# Patient Record
Sex: MALE | Race: WHITE | NOT HISPANIC OR LATINO | Employment: FULL TIME | URBAN - METROPOLITAN AREA
[De-identification: names, ages, dates, MRNs, and addresses within clinical notes are randomized per-mention and may not be internally consistent; named-entity substitution may affect disease eponyms.]

---

## 2021-06-22 ENCOUNTER — OFFICE VISIT (OUTPATIENT)
Dept: PODIATRY | Facility: CLINIC | Age: 66
End: 2021-06-22
Payer: COMMERCIAL

## 2021-06-22 ENCOUNTER — HOSPITAL ENCOUNTER (OUTPATIENT)
Dept: RADIOLOGY | Facility: HOSPITAL | Age: 66
Discharge: HOME | End: 2021-06-22
Attending: PODIATRIST
Payer: COMMERCIAL

## 2021-06-22 VITALS
WEIGHT: 163.63 LBS | HEIGHT: 66 IN | DIASTOLIC BLOOD PRESSURE: 68 MMHG | BODY MASS INDEX: 26.3 KG/M2 | SYSTOLIC BLOOD PRESSURE: 97 MMHG | HEART RATE: 72 BPM | RESPIRATION RATE: 18 BRPM

## 2021-06-22 DIAGNOSIS — D21.21 FIBROMA OF RIGHT FOOT: ICD-10-CM

## 2021-06-22 DIAGNOSIS — M76.61 ACHILLES TENDINITIS OF RIGHT LOWER EXTREMITY: Primary | ICD-10-CM

## 2021-06-22 DIAGNOSIS — M76.61 ACHILLES BURSITIS OF RIGHT LOWER EXTREMITY: ICD-10-CM

## 2021-06-22 DIAGNOSIS — M79.672 HEEL PAIN, BILATERAL: ICD-10-CM

## 2021-06-22 DIAGNOSIS — M79.671 HEEL PAIN, BILATERAL: ICD-10-CM

## 2021-06-22 PROCEDURE — 73620 X-RAY EXAM OF FOOT: CPT | Mod: 26,50,, | Performed by: RADIOLOGY

## 2021-06-22 PROCEDURE — 73620 X-RAY EXAM OF FOOT: CPT | Mod: TC,50,FY

## 2021-06-22 PROCEDURE — 99203 OFFICE O/P NEW LOW 30 MIN: CPT | Mod: S$GLB,,, | Performed by: PODIATRIST

## 2021-06-22 PROCEDURE — 99999 PR PBB SHADOW E&M-NEW PATIENT-LVL IV: CPT | Mod: PBBFAC,,, | Performed by: PODIATRIST

## 2021-06-22 PROCEDURE — 1125F PR PAIN SEVERITY QUANTIFIED, PAIN PRESENT: ICD-10-PCS | Mod: S$GLB,,, | Performed by: PODIATRIST

## 2021-06-22 PROCEDURE — 73620 XR FOOT 2 VIEW BILATERAL: ICD-10-PCS | Mod: 26,50,, | Performed by: RADIOLOGY

## 2021-06-22 PROCEDURE — 1125F AMNT PAIN NOTED PAIN PRSNT: CPT | Mod: S$GLB,,, | Performed by: PODIATRIST

## 2021-06-22 PROCEDURE — 99203 PR OFFICE/OUTPT VISIT, NEW, LEVL III, 30-44 MIN: ICD-10-PCS | Mod: S$GLB,,, | Performed by: PODIATRIST

## 2021-06-22 PROCEDURE — 99999 PR PBB SHADOW E&M-NEW PATIENT-LVL IV: ICD-10-PCS | Mod: PBBFAC,,, | Performed by: PODIATRIST

## 2021-06-22 RX ORDER — AMOXICILLIN 500 MG
2 CAPSULE ORAL DAILY
COMMUNITY

## 2021-06-22 RX ORDER — CHOLECALCIFEROL (VITAMIN D3) 25 MCG
1000 TABLET ORAL DAILY
COMMUNITY

## 2021-07-22 ENCOUNTER — OFFICE VISIT (OUTPATIENT)
Dept: PODIATRY | Facility: CLINIC | Age: 66
End: 2021-07-22
Payer: COMMERCIAL

## 2021-07-22 VITALS
DIASTOLIC BLOOD PRESSURE: 81 MMHG | WEIGHT: 167.38 LBS | HEART RATE: 79 BPM | RESPIRATION RATE: 18 BRPM | SYSTOLIC BLOOD PRESSURE: 141 MMHG | HEIGHT: 66 IN | BODY MASS INDEX: 26.9 KG/M2

## 2021-07-22 DIAGNOSIS — M76.61 ACHILLES TENDINITIS OF RIGHT LOWER EXTREMITY: ICD-10-CM

## 2021-07-22 DIAGNOSIS — D21.21 FIBROMA OF RIGHT FOOT: ICD-10-CM

## 2021-07-22 DIAGNOSIS — M79.672 HEEL PAIN, BILATERAL: ICD-10-CM

## 2021-07-22 DIAGNOSIS — M79.671 HEEL PAIN, BILATERAL: ICD-10-CM

## 2021-07-22 DIAGNOSIS — M76.61 ACHILLES BURSITIS OF RIGHT LOWER EXTREMITY: Primary | ICD-10-CM

## 2021-07-22 PROCEDURE — 99999 PR PBB SHADOW E&M-EST. PATIENT-LVL III: ICD-10-PCS | Mod: PBBFAC,,, | Performed by: PODIATRIST

## 2021-07-22 PROCEDURE — 99214 OFFICE O/P EST MOD 30 MIN: CPT | Mod: S$GLB,,, | Performed by: PODIATRIST

## 2021-07-22 PROCEDURE — 99214 PR OFFICE/OUTPT VISIT, EST, LEVL IV, 30-39 MIN: ICD-10-PCS | Mod: S$GLB,,, | Performed by: PODIATRIST

## 2021-07-22 PROCEDURE — 99999 PR PBB SHADOW E&M-EST. PATIENT-LVL III: CPT | Mod: PBBFAC,,, | Performed by: PODIATRIST

## 2021-09-09 ENCOUNTER — OFFICE VISIT (OUTPATIENT)
Dept: PODIATRY | Facility: CLINIC | Age: 66
End: 2021-09-09
Payer: COMMERCIAL

## 2021-09-09 VITALS
DIASTOLIC BLOOD PRESSURE: 76 MMHG | SYSTOLIC BLOOD PRESSURE: 128 MMHG | RESPIRATION RATE: 18 BRPM | BODY MASS INDEX: 26.86 KG/M2 | HEART RATE: 80 BPM | WEIGHT: 167.13 LBS | HEIGHT: 66 IN

## 2021-09-09 DIAGNOSIS — D21.21 FIBROMA OF RIGHT FOOT: ICD-10-CM

## 2021-09-09 DIAGNOSIS — M76.61 ACHILLES BURSITIS OF RIGHT LOWER EXTREMITY: Primary | ICD-10-CM

## 2021-09-09 PROCEDURE — 99213 PR OFFICE/OUTPT VISIT, EST, LEVL III, 20-29 MIN: ICD-10-PCS | Mod: S$GLB,,, | Performed by: PODIATRIST

## 2021-09-09 PROCEDURE — 99999 PR PBB SHADOW E&M-EST. PATIENT-LVL III: CPT | Mod: PBBFAC,,, | Performed by: PODIATRIST

## 2021-09-09 PROCEDURE — 99999 PR PBB SHADOW E&M-EST. PATIENT-LVL III: ICD-10-PCS | Mod: PBBFAC,,, | Performed by: PODIATRIST

## 2021-09-09 PROCEDURE — 99213 OFFICE O/P EST LOW 20 MIN: CPT | Mod: S$GLB,,, | Performed by: PODIATRIST

## 2021-10-23 ENCOUNTER — HOSPITAL ENCOUNTER (EMERGENCY)
Facility: HOSPITAL | Age: 66
Discharge: HOME | End: 2021-10-23
Attending: FAMILY MEDICINE
Payer: COMMERCIAL

## 2021-10-23 VITALS
OXYGEN SATURATION: 98 % | RESPIRATION RATE: 18 BRPM | HEIGHT: 66 IN | TEMPERATURE: 98 F | DIASTOLIC BLOOD PRESSURE: 82 MMHG | WEIGHT: 155 LBS | HEART RATE: 79 BPM | BODY MASS INDEX: 24.91 KG/M2 | SYSTOLIC BLOOD PRESSURE: 106 MMHG

## 2021-10-23 DIAGNOSIS — N30.01 ACUTE CYSTITIS WITH HEMATURIA: Primary | ICD-10-CM

## 2021-10-23 LAB
ALBUMIN SERPL BCP-MCNC: 3.8 G/DL (ref 3.5–5.2)
ALP SERPL-CCNC: 56 U/L (ref 55–135)
ALT SERPL W/O P-5'-P-CCNC: 18 U/L (ref 10–44)
ANION GAP SERPL CALC-SCNC: 10 MMOL/L (ref 8–16)
AST SERPL-CCNC: 14 U/L (ref 10–40)
BACTERIA #/AREA URNS HPF: ABNORMAL /HPF
BASOPHILS # BLD AUTO: 0.06 K/UL (ref 0–0.2)
BASOPHILS NFR BLD: 0.8 % (ref 0–1.9)
BILIRUB SERPL-MCNC: 0.8 MG/DL (ref 0.1–1)
BILIRUB UR QL STRIP: NEGATIVE
BUN SERPL-MCNC: 17 MG/DL (ref 8–23)
CALCIUM SERPL-MCNC: 9.4 MG/DL (ref 8.7–10.5)
CHLORIDE SERPL-SCNC: 104 MMOL/L (ref 95–110)
CLARITY UR: CLEAR
CO2 SERPL-SCNC: 25 MMOL/L (ref 23–29)
COLOR UR: YELLOW
CREAT SERPL-MCNC: 1 MG/DL (ref 0.5–1.4)
DIFFERENTIAL METHOD: NORMAL
EOSINOPHIL # BLD AUTO: 0.1 K/UL (ref 0–0.5)
EOSINOPHIL NFR BLD: 1.4 % (ref 0–8)
ERYTHROCYTE [DISTWIDTH] IN BLOOD BY AUTOMATED COUNT: 13 % (ref 11.5–14.5)
EST. GFR  (AFRICAN AMERICAN): >60 ML/MIN/1.73 M^2
EST. GFR  (NON AFRICAN AMERICAN): >60 ML/MIN/1.73 M^2
GLUCOSE SERPL-MCNC: 93 MG/DL (ref 70–110)
GLUCOSE UR QL STRIP: NEGATIVE
HCT VFR BLD AUTO: 44.1 % (ref 40–54)
HGB BLD-MCNC: 15 G/DL (ref 14–18)
HGB UR QL STRIP: ABNORMAL
HYALINE CASTS #/AREA URNS LPF: 0 /LPF
IMM GRANULOCYTES # BLD AUTO: 0.02 K/UL (ref 0–0.04)
IMM GRANULOCYTES NFR BLD AUTO: 0.3 % (ref 0–0.5)
KETONES UR QL STRIP: NEGATIVE
LEUKOCYTE ESTERASE UR QL STRIP: ABNORMAL
LYMPHOCYTES # BLD AUTO: 1.7 K/UL (ref 1–4.8)
LYMPHOCYTES NFR BLD: 22.2 % (ref 18–48)
MCH RBC QN AUTO: 29.5 PG (ref 27–31)
MCHC RBC AUTO-ENTMCNC: 34 G/DL (ref 32–36)
MCV RBC AUTO: 87 FL (ref 82–98)
MICROSCOPIC COMMENT: ABNORMAL
MONOCYTES # BLD AUTO: 0.8 K/UL (ref 0.3–1)
MONOCYTES NFR BLD: 10 % (ref 4–15)
NEUTROPHILS # BLD AUTO: 5.1 K/UL (ref 1.8–7.7)
NEUTROPHILS NFR BLD: 65.3 % (ref 38–73)
NITRITE UR QL STRIP: POSITIVE
NRBC BLD-RTO: 0 /100 WBC
PH UR STRIP: 7 [PH] (ref 5–8)
PLATELET # BLD AUTO: 210 K/UL (ref 150–450)
PMV BLD AUTO: 9.6 FL (ref 9.2–12.9)
POTASSIUM SERPL-SCNC: 4.3 MMOL/L (ref 3.5–5.1)
PROT SERPL-MCNC: 7.4 G/DL (ref 6–8.4)
PROT UR QL STRIP: ABNORMAL
RBC # BLD AUTO: 5.09 M/UL (ref 4.6–6.2)
RBC #/AREA URNS HPF: 30 /HPF (ref 0–4)
SODIUM SERPL-SCNC: 139 MMOL/L (ref 136–145)
SP GR UR STRIP: >=1.03 (ref 1–1.03)
URN SPEC COLLECT METH UR: ABNORMAL
UROBILINOGEN UR STRIP-ACNC: NEGATIVE EU/DL
WBC # BLD AUTO: 7.78 K/UL (ref 3.9–12.7)
WBC #/AREA URNS HPF: >100 /HPF (ref 0–5)

## 2021-10-23 PROCEDURE — 80053 COMPREHEN METABOLIC PANEL: CPT | Performed by: FAMILY MEDICINE

## 2021-10-23 PROCEDURE — 25000003 PHARM REV CODE 250: Performed by: FAMILY MEDICINE

## 2021-10-23 PROCEDURE — 81000 URINALYSIS NONAUTO W/SCOPE: CPT | Performed by: FAMILY MEDICINE

## 2021-10-23 PROCEDURE — 87086 URINE CULTURE/COLONY COUNT: CPT | Performed by: FAMILY MEDICINE

## 2021-10-23 PROCEDURE — 87088 URINE BACTERIA CULTURE: CPT | Performed by: FAMILY MEDICINE

## 2021-10-23 PROCEDURE — 85025 COMPLETE CBC W/AUTO DIFF WBC: CPT | Performed by: FAMILY MEDICINE

## 2021-10-23 PROCEDURE — 87077 CULTURE AEROBIC IDENTIFY: CPT | Performed by: FAMILY MEDICINE

## 2021-10-23 PROCEDURE — 99283 EMERGENCY DEPT VISIT LOW MDM: CPT | Mod: 25

## 2021-10-23 PROCEDURE — 87186 SC STD MICRODIL/AGAR DIL: CPT | Performed by: FAMILY MEDICINE

## 2021-10-23 RX ORDER — LEVOFLOXACIN 500 MG/1
500 TABLET, FILM COATED ORAL DAILY
Qty: 5 TABLET | Refills: 0 | Status: SHIPPED | OUTPATIENT
Start: 2021-10-23 | End: 2021-10-23 | Stop reason: SDUPTHER

## 2021-10-23 RX ORDER — LEVOFLOXACIN 250 MG/1
500 TABLET ORAL
Status: COMPLETED | OUTPATIENT
Start: 2021-10-23 | End: 2021-10-23

## 2021-10-23 RX ORDER — LEVOFLOXACIN 500 MG/1
500 TABLET, FILM COATED ORAL DAILY
Qty: 5 TABLET | Refills: 0 | Status: SHIPPED | OUTPATIENT
Start: 2021-10-23 | End: 2021-10-30

## 2021-10-23 RX ADMIN — LEVOFLOXACIN 500 MG: 250 TABLET, FILM COATED ORAL at 01:10

## 2021-10-25 LAB — BACTERIA UR CULT: ABNORMAL

## 2021-11-24 ENCOUNTER — OFFICE VISIT (OUTPATIENT)
Dept: FAMILY MEDICINE | Facility: CLINIC | Age: 66
End: 2021-11-24
Payer: COMMERCIAL

## 2021-11-24 ENCOUNTER — LAB VISIT (OUTPATIENT)
Dept: LAB | Facility: HOSPITAL | Age: 66
End: 2021-11-24
Attending: FAMILY MEDICINE
Payer: COMMERCIAL

## 2021-11-24 VITALS
HEART RATE: 85 BPM | BODY MASS INDEX: 26.52 KG/M2 | RESPIRATION RATE: 12 BRPM | SYSTOLIC BLOOD PRESSURE: 110 MMHG | HEIGHT: 66 IN | OXYGEN SATURATION: 98 % | TEMPERATURE: 98 F | DIASTOLIC BLOOD PRESSURE: 68 MMHG | WEIGHT: 165 LBS

## 2021-11-24 DIAGNOSIS — Z12.11 COLON CANCER SCREENING: ICD-10-CM

## 2021-11-24 DIAGNOSIS — Z00.00 ANNUAL PHYSICAL EXAM: ICD-10-CM

## 2021-11-24 DIAGNOSIS — Z11.59 ENCOUNTER FOR HEPATITIS C SCREENING TEST FOR LOW RISK PATIENT: ICD-10-CM

## 2021-11-24 DIAGNOSIS — Z76.89 ENCOUNTER TO ESTABLISH CARE: Primary | ICD-10-CM

## 2021-11-24 DIAGNOSIS — Z11.4 SCREENING FOR HIV (HUMAN IMMUNODEFICIENCY VIRUS): ICD-10-CM

## 2021-11-24 DIAGNOSIS — Z12.5 SCREENING FOR PROSTATE CANCER: ICD-10-CM

## 2021-11-24 DIAGNOSIS — N30.01 ACUTE CYSTITIS WITH HEMATURIA: ICD-10-CM

## 2021-11-24 DIAGNOSIS — R63.4 WEIGHT LOSS: ICD-10-CM

## 2021-11-24 LAB
ALBUMIN SERPL BCP-MCNC: 4.3 G/DL (ref 3.5–5.2)
ALP SERPL-CCNC: 49 U/L (ref 55–135)
ALT SERPL W/O P-5'-P-CCNC: 25 U/L (ref 10–44)
ANION GAP SERPL CALC-SCNC: 13 MMOL/L (ref 8–16)
AST SERPL-CCNC: 18 U/L (ref 10–40)
BASOPHILS # BLD AUTO: 0.05 K/UL (ref 0–0.2)
BASOPHILS NFR BLD: 1.1 % (ref 0–1.9)
BILIRUB SERPL-MCNC: 0.8 MG/DL (ref 0.1–1)
BUN SERPL-MCNC: 13 MG/DL (ref 8–23)
CALCIUM SERPL-MCNC: 9.8 MG/DL (ref 8.7–10.5)
CHLORIDE SERPL-SCNC: 103 MMOL/L (ref 95–110)
CHOLEST SERPL-MCNC: 254 MG/DL (ref 120–199)
CHOLEST/HDLC SERPL: 4.3 {RATIO} (ref 2–5)
CO2 SERPL-SCNC: 25 MMOL/L (ref 23–29)
COMPLEXED PSA SERPL-MCNC: 4.5 NG/ML (ref 0–4)
CREAT SERPL-MCNC: 1 MG/DL (ref 0.5–1.4)
DIFFERENTIAL METHOD: NORMAL
EOSINOPHIL # BLD AUTO: 0.1 K/UL (ref 0–0.5)
EOSINOPHIL NFR BLD: 2.9 % (ref 0–8)
ERYTHROCYTE [DISTWIDTH] IN BLOOD BY AUTOMATED COUNT: 12.6 % (ref 11.5–14.5)
EST. GFR  (AFRICAN AMERICAN): >60 ML/MIN/1.73 M^2
EST. GFR  (NON AFRICAN AMERICAN): >60 ML/MIN/1.73 M^2
ESTIMATED AVG GLUCOSE: 117 MG/DL (ref 68–131)
GLUCOSE SERPL-MCNC: 104 MG/DL (ref 70–110)
HBA1C MFR BLD: 5.7 % (ref 4–5.6)
HCT VFR BLD AUTO: 46.4 % (ref 40–54)
HDLC SERPL-MCNC: 59 MG/DL (ref 40–75)
HDLC SERPL: 23.2 % (ref 20–50)
HGB BLD-MCNC: 16 G/DL (ref 14–18)
IMM GRANULOCYTES # BLD AUTO: 0.01 K/UL (ref 0–0.04)
IMM GRANULOCYTES NFR BLD AUTO: 0.2 % (ref 0–0.5)
LDLC SERPL CALC-MCNC: 181.6 MG/DL (ref 63–159)
LYMPHOCYTES # BLD AUTO: 1.5 K/UL (ref 1–4.8)
LYMPHOCYTES NFR BLD: 33.5 % (ref 18–48)
MCH RBC QN AUTO: 29.3 PG (ref 27–31)
MCHC RBC AUTO-ENTMCNC: 34.5 G/DL (ref 32–36)
MCV RBC AUTO: 85 FL (ref 82–98)
MONOCYTES # BLD AUTO: 0.5 K/UL (ref 0.3–1)
MONOCYTES NFR BLD: 10.1 % (ref 4–15)
NEUTROPHILS # BLD AUTO: 2.3 K/UL (ref 1.8–7.7)
NEUTROPHILS NFR BLD: 52.2 % (ref 38–73)
NONHDLC SERPL-MCNC: 195 MG/DL
NRBC BLD-RTO: 0 /100 WBC
PLATELET # BLD AUTO: 169 K/UL (ref 150–450)
PMV BLD AUTO: 11 FL (ref 9.2–12.9)
POTASSIUM SERPL-SCNC: 4.2 MMOL/L (ref 3.5–5.1)
PROT SERPL-MCNC: 7.7 G/DL (ref 6–8.4)
RBC # BLD AUTO: 5.47 M/UL (ref 4.6–6.2)
SODIUM SERPL-SCNC: 141 MMOL/L (ref 136–145)
T4 FREE SERPL-MCNC: 0.9 NG/DL (ref 0.71–1.51)
TRIGL SERPL-MCNC: 67 MG/DL (ref 30–150)
TSH SERPL DL<=0.005 MIU/L-ACNC: 1.67 UIU/ML (ref 0.4–4)
WBC # BLD AUTO: 4.45 K/UL (ref 3.9–12.7)

## 2021-11-24 PROCEDURE — 99387 INIT PM E/M NEW PAT 65+ YRS: CPT | Mod: S$GLB,,, | Performed by: FAMILY MEDICINE

## 2021-11-24 PROCEDURE — 80053 COMPREHEN METABOLIC PANEL: CPT | Performed by: FAMILY MEDICINE

## 2021-11-24 PROCEDURE — 84443 ASSAY THYROID STIM HORMONE: CPT | Performed by: FAMILY MEDICINE

## 2021-11-24 PROCEDURE — 84439 ASSAY OF FREE THYROXINE: CPT | Performed by: FAMILY MEDICINE

## 2021-11-24 PROCEDURE — 99999 PR PBB SHADOW E&M-EST. PATIENT-LVL IV: ICD-10-PCS | Mod: PBBFAC,,, | Performed by: FAMILY MEDICINE

## 2021-11-24 PROCEDURE — 99387 PR PREVENTIVE VISIT,NEW,65 & OVER: ICD-10-PCS | Mod: S$GLB,,, | Performed by: FAMILY MEDICINE

## 2021-11-24 PROCEDURE — 80061 LIPID PANEL: CPT | Performed by: FAMILY MEDICINE

## 2021-11-24 PROCEDURE — 36415 COLL VENOUS BLD VENIPUNCTURE: CPT | Performed by: FAMILY MEDICINE

## 2021-11-24 PROCEDURE — 86803 HEPATITIS C AB TEST: CPT | Performed by: FAMILY MEDICINE

## 2021-11-24 PROCEDURE — 83036 HEMOGLOBIN GLYCOSYLATED A1C: CPT | Performed by: FAMILY MEDICINE

## 2021-11-24 PROCEDURE — 87389 HIV-1 AG W/HIV-1&-2 AB AG IA: CPT | Performed by: FAMILY MEDICINE

## 2021-11-24 PROCEDURE — 99999 PR PBB SHADOW E&M-EST. PATIENT-LVL IV: CPT | Mod: PBBFAC,,, | Performed by: FAMILY MEDICINE

## 2021-11-24 PROCEDURE — 85025 COMPLETE CBC W/AUTO DIFF WBC: CPT | Performed by: FAMILY MEDICINE

## 2021-11-24 PROCEDURE — 84153 ASSAY OF PSA TOTAL: CPT | Performed by: FAMILY MEDICINE

## 2021-11-26 LAB
HCV AB SERPL QL IA: NEGATIVE
HIV 1+2 AB+HIV1 P24 AG SERPL QL IA: NEGATIVE

## 2021-11-29 ENCOUNTER — PATIENT MESSAGE (OUTPATIENT)
Dept: FAMILY MEDICINE | Facility: CLINIC | Age: 66
End: 2021-11-29
Payer: COMMERCIAL

## 2021-12-02 ENCOUNTER — PATIENT MESSAGE (OUTPATIENT)
Dept: FAMILY MEDICINE | Facility: CLINIC | Age: 66
End: 2021-12-02
Payer: COMMERCIAL

## 2021-12-02 DIAGNOSIS — E78.5 HYPERLIPIDEMIA, UNSPECIFIED HYPERLIPIDEMIA TYPE: Primary | ICD-10-CM

## 2021-12-02 DIAGNOSIS — R97.20 ELEVATED PSA: ICD-10-CM

## 2021-12-02 RX ORDER — ATORVASTATIN CALCIUM 40 MG/1
40 TABLET, FILM COATED ORAL DAILY
Qty: 90 TABLET | Refills: 3 | Status: SHIPPED | OUTPATIENT
Start: 2021-12-02 | End: 2022-11-09 | Stop reason: SDUPTHER

## 2021-12-03 ENCOUNTER — TELEPHONE (OUTPATIENT)
Dept: FAMILY MEDICINE | Facility: CLINIC | Age: 66
End: 2021-12-03
Payer: COMMERCIAL

## 2021-12-29 ENCOUNTER — LAB VISIT (OUTPATIENT)
Dept: LAB | Facility: HOSPITAL | Age: 66
End: 2021-12-29
Attending: FAMILY MEDICINE
Payer: COMMERCIAL

## 2021-12-29 DIAGNOSIS — R97.20 ELEVATED PSA: ICD-10-CM

## 2021-12-29 LAB — COMPLEXED PSA SERPL-MCNC: 3.5 NG/ML (ref 0–4)

## 2021-12-29 PROCEDURE — 84153 ASSAY OF PSA TOTAL: CPT | Performed by: FAMILY MEDICINE

## 2021-12-29 PROCEDURE — 36415 COLL VENOUS BLD VENIPUNCTURE: CPT | Performed by: FAMILY MEDICINE

## 2022-02-25 ENCOUNTER — OFFICE VISIT (OUTPATIENT)
Dept: FAMILY MEDICINE | Facility: CLINIC | Age: 67
End: 2022-02-25
Payer: COMMERCIAL

## 2022-02-25 VITALS
RESPIRATION RATE: 12 BRPM | HEIGHT: 66 IN | SYSTOLIC BLOOD PRESSURE: 134 MMHG | HEART RATE: 76 BPM | BODY MASS INDEX: 26.9 KG/M2 | DIASTOLIC BLOOD PRESSURE: 72 MMHG | WEIGHT: 167.38 LBS | TEMPERATURE: 98 F | OXYGEN SATURATION: 97 %

## 2022-02-25 DIAGNOSIS — R10.12 LUQ ABDOMINAL PAIN: Primary | ICD-10-CM

## 2022-02-25 PROCEDURE — 1159F MED LIST DOCD IN RCRD: CPT | Mod: CPTII,S$GLB,, | Performed by: FAMILY MEDICINE

## 2022-02-25 PROCEDURE — 1101F PR PT FALLS ASSESS DOC 0-1 FALLS W/OUT INJ PAST YR: ICD-10-PCS | Mod: CPTII,S$GLB,, | Performed by: FAMILY MEDICINE

## 2022-02-25 PROCEDURE — 1159F PR MEDICATION LIST DOCUMENTED IN MEDICAL RECORD: ICD-10-PCS | Mod: CPTII,S$GLB,, | Performed by: FAMILY MEDICINE

## 2022-02-25 PROCEDURE — 3075F SYST BP GE 130 - 139MM HG: CPT | Mod: CPTII,S$GLB,, | Performed by: FAMILY MEDICINE

## 2022-02-25 PROCEDURE — 3008F PR BODY MASS INDEX (BMI) DOCUMENTED: ICD-10-PCS | Mod: CPTII,S$GLB,, | Performed by: FAMILY MEDICINE

## 2022-02-25 PROCEDURE — 1160F RVW MEDS BY RX/DR IN RCRD: CPT | Mod: CPTII,S$GLB,, | Performed by: FAMILY MEDICINE

## 2022-02-25 PROCEDURE — 3075F PR MOST RECENT SYSTOLIC BLOOD PRESS GE 130-139MM HG: ICD-10-PCS | Mod: CPTII,S$GLB,, | Performed by: FAMILY MEDICINE

## 2022-02-25 PROCEDURE — 99214 PR OFFICE/OUTPT VISIT, EST, LEVL IV, 30-39 MIN: ICD-10-PCS | Mod: S$GLB,,, | Performed by: FAMILY MEDICINE

## 2022-02-25 PROCEDURE — 3288F PR FALLS RISK ASSESSMENT DOCUMENTED: ICD-10-PCS | Mod: CPTII,S$GLB,, | Performed by: FAMILY MEDICINE

## 2022-02-25 PROCEDURE — 3078F PR MOST RECENT DIASTOLIC BLOOD PRESSURE < 80 MM HG: ICD-10-PCS | Mod: CPTII,S$GLB,, | Performed by: FAMILY MEDICINE

## 2022-02-25 PROCEDURE — 99999 PR PBB SHADOW E&M-EST. PATIENT-LVL IV: ICD-10-PCS | Mod: PBBFAC,,, | Performed by: FAMILY MEDICINE

## 2022-02-25 PROCEDURE — 1101F PT FALLS ASSESS-DOCD LE1/YR: CPT | Mod: CPTII,S$GLB,, | Performed by: FAMILY MEDICINE

## 2022-02-25 PROCEDURE — 1125F AMNT PAIN NOTED PAIN PRSNT: CPT | Mod: CPTII,S$GLB,, | Performed by: FAMILY MEDICINE

## 2022-02-25 PROCEDURE — 1125F PR PAIN SEVERITY QUANTIFIED, PAIN PRESENT: ICD-10-PCS | Mod: CPTII,S$GLB,, | Performed by: FAMILY MEDICINE

## 2022-02-25 PROCEDURE — 1160F PR REVIEW ALL MEDS BY PRESCRIBER/CLIN PHARMACIST DOCUMENTED: ICD-10-PCS | Mod: CPTII,S$GLB,, | Performed by: FAMILY MEDICINE

## 2022-02-25 PROCEDURE — 3288F FALL RISK ASSESSMENT DOCD: CPT | Mod: CPTII,S$GLB,, | Performed by: FAMILY MEDICINE

## 2022-02-25 PROCEDURE — 99999 PR PBB SHADOW E&M-EST. PATIENT-LVL IV: CPT | Mod: PBBFAC,,, | Performed by: FAMILY MEDICINE

## 2022-02-25 PROCEDURE — 99214 OFFICE O/P EST MOD 30 MIN: CPT | Mod: S$GLB,,, | Performed by: FAMILY MEDICINE

## 2022-02-25 PROCEDURE — 3078F DIAST BP <80 MM HG: CPT | Mod: CPTII,S$GLB,, | Performed by: FAMILY MEDICINE

## 2022-02-25 PROCEDURE — 3008F BODY MASS INDEX DOCD: CPT | Mod: CPTII,S$GLB,, | Performed by: FAMILY MEDICINE

## 2022-02-25 RX ORDER — GABAPENTIN 100 MG/1
100 CAPSULE ORAL 3 TIMES DAILY
Qty: 90 CAPSULE | Refills: 11 | Status: SHIPPED | OUTPATIENT
Start: 2022-02-25 | End: 2023-02-25

## 2022-02-25 RX ORDER — DIAZEPAM 2 MG/1
2 TABLET ORAL ONCE
Qty: 1 TABLET | Refills: 0 | Status: SHIPPED | OUTPATIENT
Start: 2022-02-25 | End: 2023-08-15

## 2022-02-25 RX ORDER — METHYLPREDNISOLONE 4 MG/1
TABLET ORAL
Qty: 21 EACH | Refills: 0 | Status: SHIPPED | OUTPATIENT
Start: 2022-02-25 | End: 2022-03-18

## 2022-02-25 NOTE — PROGRESS NOTES
"[facility] - Clinic Note    Subjective      Mr. Thomas is a 66 y.o. male who presents to clinic for Flank Pain (L side x3w)    Patient reports that for the last 3-4 weeks he has had in intermittent pain on his left upper quadrant.  Denies any changes to bowel movements.  Denies any pain or burning with urination.  Denies any blood in the urine.  Denies any nausea or vomiting.  Started off as stinging pains in this area does now little bit more severe.  Throughout the day.  No overlying rash.    PMH Fernando has no past medical history on file.   PSXH Fernando has a past surgical history that includes Carpal tunnel release (Bilateral); Hernia repair; and Knee Arthroplasty (Right).    Fernando's family history includes Cancer in his father and mother.   SH Fernando reports that he has never smoked. He has never used smokeless tobacco. He reports previous alcohol use. He reports previous drug use.   ALG Fernando is allergic to augmentin [amoxicillin-pot clavulanate] and biaxin [clarithromycin].   MED Fernando has a current medication list which includes the following prescription(s): atorvastatin, fish oil-omega-3 fatty acids, vitamin d, diazepam, gabapentin, and methylprednisolone.     Review of Systems   Constitutional: Negative for chills and fever.   Respiratory: Negative for shortness of breath.    Cardiovascular: Negative for chest pain.   Gastrointestinal: Positive for abdominal pain. Negative for constipation, diarrhea, nausea and vomiting.   Genitourinary: Negative for dysuria.     Objective     /72 (BP Location: Left arm, Patient Position: Sitting, BP Method: Large (Manual))   Pulse 76   Temp 98.1 °F (36.7 °C) (Temporal)   Resp 12   Ht 5' 6" (1.676 m)   Wt 75.9 kg (167 lb 6.4 oz)   SpO2 97%   BMI 27.02 kg/m²     Physical Exam  Vitals and nursing note reviewed.   Constitutional:       General: He is not in acute distress.     Appearance: Normal appearance. He is well-developed. He is not " diaphoretic.   HENT:      Head: Normocephalic and atraumatic.      Right Ear: External ear normal.      Left Ear: External ear normal.   Eyes:      General:         Right eye: No discharge.         Left eye: No discharge.   Cardiovascular:      Rate and Rhythm: Normal rate and regular rhythm.      Heart sounds: Normal heart sounds.   Pulmonary:      Effort: Pulmonary effort is normal.      Breath sounds: Normal breath sounds. No wheezing or rales.   Abdominal:      General: Abdomen is flat. Bowel sounds are normal. There is no distension.      Palpations: Abdomen is soft.      Tenderness: There is no abdominal tenderness.   Skin:     General: Skin is warm and dry.   Neurological:      Mental Status: He is alert and oriented to person, place, and time. Mental status is at baseline.   Psychiatric:         Mood and Affect: Mood normal.         Behavior: Behavior normal.         Thought Content: Thought content normal.         Judgment: Judgment normal.        Assessment/Plan     1. LUQ abdominal pain  methylPREDNISolone (MEDROL DOSEPACK) 4 mg tablet    gabapentin (NEURONTIN) 100 MG capsule    CT Abdomen Pelvis W Wo Contrast    Basic Metabolic Panel    diazePAM (VALIUM) 2 MG tablet     Unclear etiology of left upper quadrant pain.  No rash but could have shingles without rash.  Will trial Medrol Dosepak and gabapentin to see if that helps with the symptoms and if this is related to inflammation.  If symptoms not improved ordered CT scan to evaluate.    Future Appointments   Date Time Provider Department Center   2/28/2022  2:30 PM [facility], LABORATORY [facility] LAB [facility]   2/28/2022  3:00 PM [facility] CT1 [facility] CT [facility]   5/24/2022  8:20 AM [facility] Huerta [facility]         Hernandez Huerta MD  Family Medicine  [facility]

## 2022-03-22 ENCOUNTER — PATIENT MESSAGE (OUTPATIENT)
Dept: ADMINISTRATIVE | Facility: HOSPITAL | Age: 67
End: 2022-03-22
Payer: COMMERCIAL

## 2022-10-20 ENCOUNTER — PATIENT MESSAGE (OUTPATIENT)
Dept: FAMILY MEDICINE | Facility: CLINIC | Age: 67
End: 2022-10-20
Payer: COMMERCIAL

## 2022-10-21 ENCOUNTER — PATIENT MESSAGE (OUTPATIENT)
Dept: FAMILY MEDICINE | Facility: CLINIC | Age: 67
End: 2022-10-21
Payer: COMMERCIAL

## 2022-10-21 DIAGNOSIS — R73.09 ELEVATED HEMOGLOBIN A1C: ICD-10-CM

## 2022-10-21 DIAGNOSIS — E78.5 HYPERLIPIDEMIA, UNSPECIFIED HYPERLIPIDEMIA TYPE: Primary | ICD-10-CM

## 2022-10-25 ENCOUNTER — OFFICE VISIT (OUTPATIENT)
Dept: FAMILY MEDICINE | Facility: CLINIC | Age: 67
End: 2022-10-25
Payer: COMMERCIAL

## 2022-10-25 VITALS
BODY MASS INDEX: 27.38 KG/M2 | TEMPERATURE: 97 F | RESPIRATION RATE: 14 BRPM | HEIGHT: 66 IN | HEART RATE: 69 BPM | SYSTOLIC BLOOD PRESSURE: 118 MMHG | WEIGHT: 170.38 LBS | DIASTOLIC BLOOD PRESSURE: 76 MMHG | OXYGEN SATURATION: 98 %

## 2022-10-25 DIAGNOSIS — E78.5 HYPERLIPIDEMIA, UNSPECIFIED HYPERLIPIDEMIA TYPE: ICD-10-CM

## 2022-10-25 DIAGNOSIS — R53.83 FATIGUE, UNSPECIFIED TYPE: ICD-10-CM

## 2022-10-25 DIAGNOSIS — Z12.5 SCREENING FOR PROSTATE CANCER: ICD-10-CM

## 2022-10-25 DIAGNOSIS — Z00.00 ANNUAL PHYSICAL EXAM: Primary | ICD-10-CM

## 2022-10-25 PROCEDURE — 99999 PR PBB SHADOW E&M-EST. PATIENT-LVL IV: ICD-10-PCS | Mod: PBBFAC,,, | Performed by: FAMILY MEDICINE

## 2022-10-25 PROCEDURE — 1101F PR PT FALLS ASSESS DOC 0-1 FALLS W/OUT INJ PAST YR: ICD-10-PCS | Mod: CPTII,S$GLB,, | Performed by: FAMILY MEDICINE

## 2022-10-25 PROCEDURE — 99999 PR PBB SHADOW E&M-EST. PATIENT-LVL IV: CPT | Mod: PBBFAC,,, | Performed by: FAMILY MEDICINE

## 2022-10-25 PROCEDURE — 3074F SYST BP LT 130 MM HG: CPT | Mod: CPTII,S$GLB,, | Performed by: FAMILY MEDICINE

## 2022-10-25 PROCEDURE — 1159F PR MEDICATION LIST DOCUMENTED IN MEDICAL RECORD: ICD-10-PCS | Mod: CPTII,S$GLB,, | Performed by: FAMILY MEDICINE

## 2022-10-25 PROCEDURE — 1160F PR REVIEW ALL MEDS BY PRESCRIBER/CLIN PHARMACIST DOCUMENTED: ICD-10-PCS | Mod: CPTII,S$GLB,, | Performed by: FAMILY MEDICINE

## 2022-10-25 PROCEDURE — 99397 PER PM REEVAL EST PAT 65+ YR: CPT | Mod: S$GLB,,, | Performed by: FAMILY MEDICINE

## 2022-10-25 PROCEDURE — 3078F PR MOST RECENT DIASTOLIC BLOOD PRESSURE < 80 MM HG: ICD-10-PCS | Mod: CPTII,S$GLB,, | Performed by: FAMILY MEDICINE

## 2022-10-25 PROCEDURE — 3074F PR MOST RECENT SYSTOLIC BLOOD PRESSURE < 130 MM HG: ICD-10-PCS | Mod: CPTII,S$GLB,, | Performed by: FAMILY MEDICINE

## 2022-10-25 PROCEDURE — 3078F DIAST BP <80 MM HG: CPT | Mod: CPTII,S$GLB,, | Performed by: FAMILY MEDICINE

## 2022-10-25 PROCEDURE — 1101F PT FALLS ASSESS-DOCD LE1/YR: CPT | Mod: CPTII,S$GLB,, | Performed by: FAMILY MEDICINE

## 2022-10-25 PROCEDURE — 3288F PR FALLS RISK ASSESSMENT DOCUMENTED: ICD-10-PCS | Mod: CPTII,S$GLB,, | Performed by: FAMILY MEDICINE

## 2022-10-25 PROCEDURE — 1125F AMNT PAIN NOTED PAIN PRSNT: CPT | Mod: CPTII,S$GLB,, | Performed by: FAMILY MEDICINE

## 2022-10-25 PROCEDURE — 3288F FALL RISK ASSESSMENT DOCD: CPT | Mod: CPTII,S$GLB,, | Performed by: FAMILY MEDICINE

## 2022-10-25 PROCEDURE — 1125F PR PAIN SEVERITY QUANTIFIED, PAIN PRESENT: ICD-10-PCS | Mod: CPTII,S$GLB,, | Performed by: FAMILY MEDICINE

## 2022-10-25 PROCEDURE — 1159F MED LIST DOCD IN RCRD: CPT | Mod: CPTII,S$GLB,, | Performed by: FAMILY MEDICINE

## 2022-10-25 PROCEDURE — 99397 PR PREVENTIVE VISIT,EST,65 & OVER: ICD-10-PCS | Mod: S$GLB,,, | Performed by: FAMILY MEDICINE

## 2022-10-25 PROCEDURE — 1160F RVW MEDS BY RX/DR IN RCRD: CPT | Mod: CPTII,S$GLB,, | Performed by: FAMILY MEDICINE

## 2022-10-25 NOTE — PROGRESS NOTES
"[facility] - Clinic Note    Subjective      Mr. Thomas is a 67 y.o. male who presents to clinic for Follow-up (6mth follow up)     patient has been feeling well overall.  No new complaints.    PMH Fernando has no past medical history on file.   PSXH Fernando has a past surgical history that includes Carpal tunnel release (Bilateral); Hernia repair; and Knee Arthroplasty (Right).   FH Fernando's family history includes Cancer in his father and mother.   SH Fernando reports that he has never smoked. He has never used smokeless tobacco. He reports that he does not currently use alcohol. He reports that he does not currently use drugs.   ALG Fernando is allergic to augmentin [amoxicillin-pot clavulanate] and biaxin [clarithromycin].   MED Fernando has a current medication list which includes the following prescription(s): atorvastatin, gabapentin, fish oil-omega-3 fatty acids, vitamin d, and diazepam.     Review of Systems   Constitutional:  Negative for chills and fever.   HENT:  Negative for congestion and rhinorrhea.    Eyes:  Negative for visual disturbance.   Respiratory:  Negative for cough and shortness of breath.    Cardiovascular:  Negative for chest pain.   Gastrointestinal:  Negative for abdominal pain, constipation, diarrhea, nausea and vomiting.   Genitourinary:  Negative for dysuria.   Musculoskeletal:  Negative for myalgias.   Skin:  Negative for rash.   Neurological:  Negative for weakness and headaches.   Objective     /76 (BP Location: Left arm, Patient Position: Sitting, BP Method: Large (Manual))   Pulse 69   Temp 97.4 °F (36.3 °C) (Temporal)   Resp 14   Ht 5' 6" (1.676 m)   Wt 77.3 kg (170 lb 6.4 oz)   SpO2 98%   BMI 27.50 kg/m²     Physical Exam  Vitals and nursing note reviewed.   Constitutional:       General: He is not in acute distress.     Appearance: Normal appearance. He is well-developed. He is not diaphoretic.   HENT:      Head: Normocephalic and atraumatic.      Right Ear: " External ear normal.      Left Ear: External ear normal.   Eyes:      General:         Right eye: No discharge.         Left eye: No discharge.   Cardiovascular:      Rate and Rhythm: Normal rate and regular rhythm.      Heart sounds: Normal heart sounds.   Pulmonary:      Effort: Pulmonary effort is normal.      Breath sounds: Normal breath sounds. No wheezing or rales.   Skin:     General: Skin is warm and dry.   Neurological:      Mental Status: He is alert and oriented to person, place, and time. Mental status is at baseline.   Psychiatric:         Mood and Affect: Mood normal.         Behavior: Behavior normal.         Thought Content: Thought content normal.         Judgment: Judgment normal.      Assessment/Plan     1. Annual physical exam        2. Screening for prostate cancer  PSA, Screening      3. Hyperlipidemia, unspecified hyperlipidemia type  Comprehensive Metabolic Panel    CBC Auto Differential      4. Fatigue, unspecified type  Vitamin B12        Due for yearly labs as above.  Follow-up in 6 months.    Future Appointments   Date Time Provider Department Center   11/2/2022  7:30 AM [facility], LABORATORY [facility] LAB [facility]   4/25/2023  1:40 PM Hernandez Huerta MD [facility]         Hernandez Huerta MD  Family Medicine  [facility]

## 2022-11-02 ENCOUNTER — LAB VISIT (OUTPATIENT)
Dept: LAB | Facility: HOSPITAL | Age: 67
End: 2022-11-02
Attending: FAMILY MEDICINE
Payer: COMMERCIAL

## 2022-11-02 DIAGNOSIS — R73.09 ELEVATED HEMOGLOBIN A1C: ICD-10-CM

## 2022-11-02 DIAGNOSIS — E78.5 HYPERLIPIDEMIA, UNSPECIFIED HYPERLIPIDEMIA TYPE: ICD-10-CM

## 2022-11-02 DIAGNOSIS — R53.83 FATIGUE, UNSPECIFIED TYPE: ICD-10-CM

## 2022-11-02 DIAGNOSIS — Z12.5 SCREENING FOR PROSTATE CANCER: ICD-10-CM

## 2022-11-02 LAB
ALBUMIN SERPL BCP-MCNC: 3.9 G/DL (ref 3.5–5.2)
ALP SERPL-CCNC: 46 U/L (ref 55–135)
ALT SERPL W/O P-5'-P-CCNC: 15 U/L (ref 10–44)
ANION GAP SERPL CALC-SCNC: 10 MMOL/L (ref 8–16)
AST SERPL-CCNC: 16 U/L (ref 10–40)
BASOPHILS # BLD AUTO: 0.05 K/UL (ref 0–0.2)
BASOPHILS NFR BLD: 1.1 % (ref 0–1.9)
BILIRUB SERPL-MCNC: 0.8 MG/DL (ref 0.1–1)
BUN SERPL-MCNC: 15 MG/DL (ref 8–23)
CALCIUM SERPL-MCNC: 9.2 MG/DL (ref 8.7–10.5)
CHLORIDE SERPL-SCNC: 104 MMOL/L (ref 95–110)
CHOLEST SERPL-MCNC: 239 MG/DL (ref 120–199)
CHOLEST/HDLC SERPL: 4.1 {RATIO} (ref 2–5)
CO2 SERPL-SCNC: 24 MMOL/L (ref 23–29)
COMPLEXED PSA SERPL-MCNC: 3.8 NG/ML (ref 0–4)
CREAT SERPL-MCNC: 0.9 MG/DL (ref 0.5–1.4)
DIFFERENTIAL METHOD: NORMAL
EOSINOPHIL # BLD AUTO: 0.2 K/UL (ref 0–0.5)
EOSINOPHIL NFR BLD: 4.5 % (ref 0–8)
ERYTHROCYTE [DISTWIDTH] IN BLOOD BY AUTOMATED COUNT: 13 % (ref 11.5–14.5)
EST. GFR  (NO RACE VARIABLE): >60 ML/MIN/1.73 M^2
ESTIMATED AVG GLUCOSE: 117 MG/DL (ref 68–131)
GLUCOSE SERPL-MCNC: 100 MG/DL (ref 70–110)
HBA1C MFR BLD: 5.7 % (ref 4–5.6)
HCT VFR BLD AUTO: 44.1 % (ref 40–54)
HDLC SERPL-MCNC: 59 MG/DL (ref 40–75)
HDLC SERPL: 24.7 % (ref 20–50)
HGB BLD-MCNC: 15.2 G/DL (ref 14–18)
IMM GRANULOCYTES # BLD AUTO: 0.01 K/UL (ref 0–0.04)
IMM GRANULOCYTES NFR BLD AUTO: 0.2 % (ref 0–0.5)
LDLC SERPL CALC-MCNC: 168 MG/DL (ref 63–159)
LYMPHOCYTES # BLD AUTO: 1.7 K/UL (ref 1–4.8)
LYMPHOCYTES NFR BLD: 36.7 % (ref 18–48)
MCH RBC QN AUTO: 29.1 PG (ref 27–31)
MCHC RBC AUTO-ENTMCNC: 34.5 G/DL (ref 32–36)
MCV RBC AUTO: 84 FL (ref 82–98)
MONOCYTES # BLD AUTO: 0.5 K/UL (ref 0.3–1)
MONOCYTES NFR BLD: 10.2 % (ref 4–15)
NEUTROPHILS # BLD AUTO: 2.2 K/UL (ref 1.8–7.7)
NEUTROPHILS NFR BLD: 47.3 % (ref 38–73)
NONHDLC SERPL-MCNC: 180 MG/DL
NRBC BLD-RTO: 0 /100 WBC
PLATELET # BLD AUTO: 157 K/UL (ref 150–450)
PMV BLD AUTO: 10.7 FL (ref 9.2–12.9)
POTASSIUM SERPL-SCNC: 3.8 MMOL/L (ref 3.5–5.1)
PROT SERPL-MCNC: 7.4 G/DL (ref 6–8.4)
RBC # BLD AUTO: 5.23 M/UL (ref 4.6–6.2)
SODIUM SERPL-SCNC: 138 MMOL/L (ref 136–145)
TRIGL SERPL-MCNC: 60 MG/DL (ref 30–150)
VIT B12 SERPL-MCNC: 618 PG/ML (ref 210–950)
WBC # BLD AUTO: 4.71 K/UL (ref 3.9–12.7)

## 2022-11-02 PROCEDURE — 80053 COMPREHEN METABOLIC PANEL: CPT | Performed by: FAMILY MEDICINE

## 2022-11-02 PROCEDURE — 80061 LIPID PANEL: CPT | Performed by: FAMILY MEDICINE

## 2022-11-02 PROCEDURE — 36415 COLL VENOUS BLD VENIPUNCTURE: CPT | Performed by: FAMILY MEDICINE

## 2022-11-02 PROCEDURE — 84153 ASSAY OF PSA TOTAL: CPT | Performed by: FAMILY MEDICINE

## 2022-11-02 PROCEDURE — 83036 HEMOGLOBIN GLYCOSYLATED A1C: CPT | Performed by: FAMILY MEDICINE

## 2022-11-02 PROCEDURE — 82607 VITAMIN B-12: CPT | Performed by: FAMILY MEDICINE

## 2022-11-02 PROCEDURE — 85025 COMPLETE CBC W/AUTO DIFF WBC: CPT | Performed by: FAMILY MEDICINE

## 2022-11-09 DIAGNOSIS — E78.5 HYPERLIPIDEMIA, UNSPECIFIED HYPERLIPIDEMIA TYPE: ICD-10-CM

## 2022-11-09 RX ORDER — ATORVASTATIN CALCIUM 80 MG/1
80 TABLET, FILM COATED ORAL DAILY
Qty: 90 TABLET | Refills: 3 | Status: SHIPPED | OUTPATIENT
Start: 2022-11-09 | End: 2023-11-09

## 2022-12-21 ENCOUNTER — HOSPITAL ENCOUNTER (EMERGENCY)
Facility: HOSPITAL | Age: 67
Discharge: HOME | End: 2022-12-21
Attending: EMERGENCY MEDICINE
Payer: COMMERCIAL

## 2022-12-21 VITALS
BODY MASS INDEX: 28.12 KG/M2 | SYSTOLIC BLOOD PRESSURE: 140 MMHG | TEMPERATURE: 98 F | HEART RATE: 74 BPM | OXYGEN SATURATION: 95 % | HEIGHT: 66 IN | DIASTOLIC BLOOD PRESSURE: 95 MMHG | WEIGHT: 175 LBS | RESPIRATION RATE: 20 BRPM

## 2022-12-21 DIAGNOSIS — T14.8XXA DISLOCATION CLOSED: ICD-10-CM
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
## 2022-12-21 PROCEDURE — 99152 MOD SED SAME PHYS/QHP 5/>YRS: CPT

## 2022-12-21 PROCEDURE — 73020 X-RAY EXAM OF SHOULDER: CPT | Mod: TC,RT

## 2022-12-21 PROCEDURE — 23650 CLTX SHO DSLC W/MNPJ WO ANES: CPT | Mod: RT

## 2022-12-21 PROCEDURE — 99285 EMERGENCY DEPT VISIT HI MDM: CPT | Mod: 25

## 2022-12-21 PROCEDURE — 63600175 PHARM REV CODE 636 W HCPCS: Performed by: EMERGENCY MEDICINE

## 2022-12-21 PROCEDURE — 73030 XR SHOULDER 1 VIEW RIGHT: ICD-10-PCS | Mod: 26,RT,, | Performed by: RADIOLOGY

## 2022-12-21 PROCEDURE — 96375 TX/PRO/DX INJ NEW DRUG ADDON: CPT

## 2022-12-21 PROCEDURE — 73030 X-RAY EXAM OF SHOULDER: CPT | Mod: 26,RT,, | Performed by: RADIOLOGY

## 2022-12-21 PROCEDURE — 25000003 PHARM REV CODE 250: Performed by: EMERGENCY MEDICINE

## 2022-12-21 PROCEDURE — 96374 THER/PROPH/DIAG INJ IV PUSH: CPT

## 2022-12-21 PROCEDURE — 73030 X-RAY EXAM OF SHOULDER: CPT | Mod: TC,RT

## 2022-12-21 PROCEDURE — 73030 XR SHOULDER TRAUMA 3 VIEW RIGHT: ICD-10-PCS | Mod: 26,RT,, | Performed by: RADIOLOGY

## 2022-12-21 RX ORDER — HYDROCODONE BITARTRATE AND ACETAMINOPHEN 5; 325 MG/1; MG/1
1 TABLET ORAL
Status: COMPLETED | OUTPATIENT
Start: 2022-12-21 | End: 2022-12-21

## 2022-12-21 RX ORDER — ETOMIDATE 2 MG/ML
20 INJECTION INTRAVENOUS
Status: COMPLETED | OUTPATIENT
Start: 2022-12-21 | End: 2022-12-21

## 2022-12-21 RX ORDER — MORPHINE SULFATE 4 MG/ML
2 INJECTION, SOLUTION INTRAMUSCULAR; INTRAVENOUS
Status: COMPLETED | OUTPATIENT
Start: 2022-12-21 | End: 2022-12-21

## 2022-12-21 RX ORDER — ONDANSETRON 2 MG/ML
4 INJECTION INTRAMUSCULAR; INTRAVENOUS
Status: COMPLETED | OUTPATIENT
Start: 2022-12-21 | End: 2022-12-21

## 2022-12-21 RX ORDER — HYDROCODONE BITARTRATE AND ACETAMINOPHEN 5; 325 MG/1; MG/1
1 TABLET ORAL EVERY 4 HOURS PRN
Qty: 8 TABLET | Refills: 0 | Status: SHIPPED | OUTPATIENT
Start: 2022-12-21 | End: 2023-08-15

## 2022-12-21 RX ADMIN — ETOMIDATE 20 MG: 2 INJECTION INTRAVENOUS at 04:12

## 2022-12-21 RX ADMIN — HYDROCODONE BITARTRATE AND ACETAMINOPHEN 1 TABLET: 5; 325 TABLET ORAL at 03:12

## 2022-12-21 RX ADMIN — ONDANSETRON HYDROCHLORIDE 4 MG: 2 SOLUTION INTRAMUSCULAR; INTRAVENOUS at 04:12

## 2022-12-21 RX ADMIN — MORPHINE SULFATE 2 MG: 4 INJECTION INTRAVENOUS at 04:12

## 2022-12-21 NOTE — ED PROVIDER NOTES
Encounter Date: 12/21/2022       History     Chief Complaint   Patient presents with    Fall     R shoulder pain     67-year-old male here from home via private vehicle for evaluation and treatment of right shoulder pain after a fall from a ladder.  Patient estimates that his feet were about 2-3 feet off the ground when the ladder slipped out from under him, causing him to fall.  He also struck his right cheek on the ground, but he denies any pain there.  Denies any numbness, tingling, or weakness.  Pain in the right shoulder is exacerbated by movement or palpation.  Denies any back pain.  Denies neck pain.  No hip pain or leg pain.  He did not try any medication at home prior to coming here.    Review of patient's allergies indicates:   Allergen Reactions    Augmentin [amoxicillin-pot clavulanate] Nausea And Vomiting    Biaxin [clarithromycin]      Past Medical History:   Diagnosis Date    Mixed hyperlipidemia      Past Surgical History:   Procedure Laterality Date    CARPAL TUNNEL RELEASE Bilateral     HERNIA REPAIR      KNEE ARTHROPLASTY Right      Family History   Problem Relation Age of Onset    Cancer Mother     Cancer Father      Social History     Tobacco Use    Smoking status: Never    Smokeless tobacco: Never   Substance Use Topics    Alcohol use: Not Currently    Drug use: Never     Review of Systems   Constitutional: Negative.    HENT: Negative.     Eyes: Negative.    Respiratory: Negative.     Cardiovascular: Negative.    Gastrointestinal: Negative.    Endocrine: Negative.    Genitourinary: Negative.    Musculoskeletal:  Positive for arthralgias.   Skin: Negative.    Allergic/Immunologic: Negative.    Neurological: Negative.    Hematological: Negative.    Psychiatric/Behavioral: Negative.       Physical Exam     Initial Vitals   BP Pulse Resp Temp SpO2   12/21/22 1503 12/21/22 1449 12/21/22 1449 12/21/22 1449 12/21/22 1449   (!) 147/106 89 19 97.9 °F (36.6 °C) 99 %      MAP       --                 Physical Exam    Nursing note and vitals reviewed.  Constitutional: He appears well-developed and well-nourished. No distress.   HENT:   Head: Normocephalic.   Nose: Nose normal.   Mouth/Throat: Oropharynx is clear and moist.   There is a slight abrasion over the left cheek.  Palpation reveals no tenderness, no bony abnormality.  No epistaxis.  Oropharynx is clear.   Eyes: Conjunctivae and EOM are normal. Pupils are equal, round, and reactive to light. No scleral icterus.   Neck: Neck supple. JVD present.    C-collar was placed in triage.  Palpation of the spine through the posterior opening of the collar did not reveal any bony tenderness or step-offs, so collar was removed.  Patient has normal range of motion the head and neck without pain or disability.  C-collar removed by me   Normal range of motion.  Cardiovascular:  Normal rate, regular rhythm, normal heart sounds and intact distal pulses.           No murmur heard.  Pulmonary/Chest: Breath sounds normal. Stridor present. No respiratory distress. He has no wheezes. He has no rhonchi. He has no rales.   Abdominal: Abdomen is soft. Bowel sounds are normal. He exhibits no distension. There is no abdominal tenderness.   Musculoskeletal:         General: Tenderness and edema present. Normal range of motion.      Cervical back: Normal range of motion and neck supple.      Comments: Patient has tenderness to palpation in the deltoid region of the right shoulder.  Patient holds the right upper extremity close against the body and states his shoulder hurts too much to move, so range of motion at the shoulder was not tested initially.  Sensory function and motor strength in the elbow forearm hand and fingers is normal.  Normal capillary refill.     Neurological: He is alert and oriented to person, place, and time. He has normal strength. No cranial nerve deficit or sensory deficit. GCS score is 15. GCS eye subscore is 4. GCS verbal subscore is 5. GCS motor subscore  is 6.   Skin: Skin is warm and dry. Capillary refill takes less than 2 seconds. No rash noted. No erythema.   Psychiatric: He has a normal mood and affect. His behavior is normal.       ED Course   Orthopedic Injury    Date/Time: 12/21/2022 4:06 PM  Performed by: Ayush Cavazos MD  Authorized by: Ayush Cavazos MD     Location procedure was performed:  [facility] EMERGENCY DEPARTMENT  Injury:     Injury location:  Shoulder    Location details:  Right shoulder    Injury type:  Dislocation    Dislocation type: anterior      Chronicity:  New    Hill-Sachs deformity?: No        Pre-procedure assessment:     Neurovascular status: Neurovascularly intact      Distal perfusion: normal      Neurological function: normal      Range of motion: reduced      Local anesthesia used?: No      Patient sedated?: Yes      ASA Class:  Class 2 - Mild Illness without functional impairment.    Mallampati Score:  Class 2 - Visualization of the soft palate, fauces, and uvula.  Date/Time of last solid:  12/21/2022 11:30 AM    Contents of Last Food Intake:  Burrito    Date/Time of last fluid:  12/21/2022 11:30 AM    Contents of Last Fluid Intake:  Coffee    Patient/Family history of anesthesia or sedation complications: No      Sedation type: moderate (conscious) sedation      Sedation:  Etomidate    Analgesia:  Morphine    Sedation start:  12/21/2022 4:46 PM    Sedation end:  12/21/2022 5:02 PM      Selections made in this section will also lock the Injury type section above.:     Manipulation performed?: Yes      Reduction method:  External rotation and scapular manipulation    Reduction method:  External rotation and scapular manipulation    Reduction method:  External rotation and scapular manipulation    Reduction method:  External rotation and scapular manipulation    Reduction method:  External rotation and scapular manipulation    Reduction method:  External rotation and scapular manipulation    Reduction successful?: Yes       Confirmation: Reduction confirmed by x-ray      Immobilization:  Sling (Shoulder immobilizer)    Complications: No      Estimated blood loss (mL):  0  Post-procedure assessment:     Distal perfusion: normal      Neurological function: normal      Range of motion: improved      Patient tolerance:  Patient tolerated the procedure well with no immediate complications  Labs Reviewed - No data to display       Imaging Results              X-Ray Shoulder 1 View Right (Final result)  Result time 12/21/22 17:20:58      Final result by Ramón Becerril IV, MD (12/21/22 17:20:58)                   Narrative:    EXAMINATION:  XR SHOULDER 1 VIEW RIGHT    CLINICAL HISTORY:  Other injury of unspecified body region, initial encounter    TECHNIQUE:  Y-view and AP view shoulder    COMPARISON:  None    FINDINGS:  Spurring is noted at the acromioclavicular joint. No obvious malalignment is noted. This is a two view examination only but significant improvement in alignment is noted since the prior examination dated the same at 03:26      Electronically signed by: Ramón Becerril MD  Date:    12/21/2022  Time:    17:20                                     X-Ray Shoulder Trauma Right (Final result)  Result time 12/21/22 15:51:37      Final result by Fernando Mullins MD (12/21/22 15:51:37)                   Impression:      Anterior right shoulder dislocation      Electronically signed by: Fernando Mullins MD  Date:    12/21/2022  Time:    15:51               Narrative:    EXAMINATION:  XR SHOULDER TRAUMA 3 VIEW RIGHT    CLINICAL HISTORY:  Fall on and from ladder, initial encounter    TECHNIQUE:  Three or four views of the right shoulder were performed.    FINDINGS:  There is anterior dislocation of the right humeral head.  No fracture identified.                                    X-Rays:   Independently Interpreted Readings:   Other Readings:  All x-rays personally reviewed by me.  Initial x-ray showed an anterior dislocation of the  right shoulder.  Postprocedure x-ray shows mild arthritic changes in the shoulder, good reduction of the dislocation.  Medications   HYDROcodone-acetaminophen 5-325 mg per tablet 1 tablet (1 tablet Oral Given 12/21/22 1512)   morphine injection 2 mg (2 mg Intravenous Given 12/21/22 1631)   etomidate injection 20 mg (20 mg Intravenous Given by Other 12/21/22 1645)   ondansetron injection 4 mg (4 mg Intravenous Given 12/21/22 1640)     Medical Decision Making:   Differential Diagnosis:   Fracture, dislocation, contusion, etc.  ED Management:  X-ray showed anterior dislocation of the right humerus.  Patient was given medications for pain and nausea.  After proper consent, patient was sedated with etomidate, and the shoulder was reduced with moderate difficulty.  Patient will be discharged home with a referral to Orthopedics.  Will discharge with prescription for Norco and Zofran.  He will also follow-up with his PCP.  Return here for any worsening signs or symptoms.                        Clinical Impression:   Final diagnoses:  [W11.XXXA] Fall from ladder, initial encounter  [T14.8XXA] Right shoulder dislocation        ED Disposition Condition    Discharge Stable          ED Prescriptions       Medication Sig Dispense Start Date End Date Auth. Provider    HYDROcodone-acetaminophen (NORCO) 5-325 mg per tablet Take 1 tablet by mouth every 4 (four) hours as needed for Pain. 8 tablet 12/21/2022 -- Ayush Cavazos MD          Follow-up Information       Follow up With Specialties Details Why Contact Info    Hernandez Huerta MD Family Medicine Call in 1 day  [address] [place] [address] [phone]      Orthopedist  Schedule an appointment as soon as possible for a visit       [facility] Emergency Dept Emergency Medicine  As needed, If symptoms worsen [address] [facility] [address] [phone]             Ayush Cavazos MD  12/21/22 4836

## 2022-12-21 NOTE — ED TRIAGE NOTES
Pt fell approx 6-8 feet off ladder. Pt states landed on R shoulder. Denies any LOC. Bruising/swelling noted to R side of face.

## 2022-12-21 NOTE — DISCHARGE INSTRUCTIONS
Take Tylenol and Motrin for discomfort.  Take Norco for unrelieved pain.  Do not drive or drink alcohol after taking Norco.  Wear your sling at all times until cleared by an orthopedist.  You have been referred to Orthopedics, and you should receive a phone call within the next 7-10 days regarding an appointment.  Return here for any worsening signs or symptoms.

## 2022-12-23 ENCOUNTER — OFFICE VISIT (OUTPATIENT)
Dept: ORTHOPEDICS | Facility: CLINIC | Age: 67
End: 2022-12-23
Payer: COMMERCIAL

## 2022-12-23 VITALS — BODY MASS INDEX: 28.08 KG/M2 | WEIGHT: 174 LBS

## 2022-12-23 DIAGNOSIS — M19.011 ARTHRITIS OF RIGHT ACROMIOCLAVICULAR JOINT: ICD-10-CM

## 2022-12-23 DIAGNOSIS — M25.511 ACUTE PAIN OF RIGHT SHOULDER: ICD-10-CM

## 2022-12-23 DIAGNOSIS — T14.8XXA DISLOCATION CLOSED: Primary | ICD-10-CM

## 2022-12-23 PROCEDURE — 3008F BODY MASS INDEX DOCD: CPT | Mod: CPTII,S$GLB,, | Performed by: ORTHOPAEDIC SURGERY

## 2022-12-23 PROCEDURE — 99999 PR PBB SHADOW E&M-EST. PATIENT-LVL III: ICD-10-PCS | Mod: PBBFAC,,, | Performed by: ORTHOPAEDIC SURGERY

## 2022-12-23 PROCEDURE — 1100F PR PT FALLS ASSESS DOC 2+ FALLS/FALL W/INJURY/YR: ICD-10-PCS | Mod: CPTII,S$GLB,, | Performed by: ORTHOPAEDIC SURGERY

## 2022-12-23 PROCEDURE — 1125F PR PAIN SEVERITY QUANTIFIED, PAIN PRESENT: ICD-10-PCS | Mod: CPTII,S$GLB,, | Performed by: ORTHOPAEDIC SURGERY

## 2022-12-23 PROCEDURE — 1100F PTFALLS ASSESS-DOCD GE2>/YR: CPT | Mod: CPTII,S$GLB,, | Performed by: ORTHOPAEDIC SURGERY

## 2022-12-23 PROCEDURE — 3288F PR FALLS RISK ASSESSMENT DOCUMENTED: ICD-10-PCS | Mod: CPTII,S$GLB,, | Performed by: ORTHOPAEDIC SURGERY

## 2022-12-23 PROCEDURE — 3044F HG A1C LEVEL LT 7.0%: CPT | Mod: CPTII,S$GLB,, | Performed by: ORTHOPAEDIC SURGERY

## 2022-12-23 PROCEDURE — 3288F FALL RISK ASSESSMENT DOCD: CPT | Mod: CPTII,S$GLB,, | Performed by: ORTHOPAEDIC SURGERY

## 2022-12-23 PROCEDURE — 1159F PR MEDICATION LIST DOCUMENTED IN MEDICAL RECORD: ICD-10-PCS | Mod: CPTII,S$GLB,, | Performed by: ORTHOPAEDIC SURGERY

## 2022-12-23 PROCEDURE — 3008F PR BODY MASS INDEX (BMI) DOCUMENTED: ICD-10-PCS | Mod: CPTII,S$GLB,, | Performed by: ORTHOPAEDIC SURGERY

## 2022-12-23 PROCEDURE — 99203 PR OFFICE/OUTPT VISIT, NEW, LEVL III, 30-44 MIN: ICD-10-PCS | Mod: S$GLB,,, | Performed by: ORTHOPAEDIC SURGERY

## 2022-12-23 PROCEDURE — 99999 PR PBB SHADOW E&M-EST. PATIENT-LVL III: CPT | Mod: PBBFAC,,, | Performed by: ORTHOPAEDIC SURGERY

## 2022-12-23 PROCEDURE — 1159F MED LIST DOCD IN RCRD: CPT | Mod: CPTII,S$GLB,, | Performed by: ORTHOPAEDIC SURGERY

## 2022-12-23 PROCEDURE — 3044F PR MOST RECENT HEMOGLOBIN A1C LEVEL <7.0%: ICD-10-PCS | Mod: CPTII,S$GLB,, | Performed by: ORTHOPAEDIC SURGERY

## 2022-12-23 PROCEDURE — 99203 OFFICE O/P NEW LOW 30 MIN: CPT | Mod: S$GLB,,, | Performed by: ORTHOPAEDIC SURGERY

## 2022-12-23 PROCEDURE — 1125F AMNT PAIN NOTED PAIN PRSNT: CPT | Mod: CPTII,S$GLB,, | Performed by: ORTHOPAEDIC SURGERY

## 2022-12-23 NOTE — PROGRESS NOTES
Subjective:      Patient ID: Fernando Thomas is a 67 y.o. male.    Chief Complaint: Injury of the Right Shoulder    Referring Provider: Ayush Cavazos Md  [address] [place] [address]    HPI:  Mr. Thomas is a 67-year-old left hand dominant gentleman who presented today for evaluation of his right shoulder.  He dislocated his shoulder on 12/21/2022 after he fell 7 ft off of a ladder.  He presented to the emergency room on his date of injury and had a reduction completed by the ER physician and was placed in a sling.  He has taken a sling off to shower.  Moving his shoulder increases his symptoms while wearing the sling decreases them.  He denied new onset numbness and tingling in his fingers    Past Medical History:   Diagnosis Date     * Mixed hyperlipidemia  Hiatal hernia treated with repair      Past Surgical History:   Procedure Laterality Date    CARPAL TUNNEL RELEASE BILATERAL Bilateral     HIATAL HERNIA REPAIR       *  * KNEE ARTHROSCOPY RIGHT  VASECTOMY  COLONOSCOPY TWICE Right        Review of patient's allergies indicates:   Allergen Reactions    Augmentin [amoxicillin-pot clavulanate] Nausea And Vomiting    Biaxin [clarithromycin]        Social History     Occupational History    /light maintenance   Tobacco Use    Smoking status: Never    Smokeless tobacco: Never   Substance and Sexual Activity    Alcohol use: Not Currently    Drug use: Never    Sexual activity: Yes     Partners: Female     Birth control/protection: None      Family History   Problem Relation Age of Onset    Cancer Mother     Cancer Father        Previous Hospitalizations:  Denied previous hospitalizations.    ROS:   Review of Systems   Constitutional: Negative for chills and fever.   HENT:  Negative for congestion.    Eyes:  Negative for blurred vision and double vision.   Cardiovascular:  Negative for chest pain and cyanosis.   Respiratory:  Negative for cough and shortness of breath.    Endocrine:  Negative for cold intolerance and polydipsia.   Hematologic/Lymphatic: Negative for adenopathy.   Skin:  Negative for flushing, itching and skin cancer.   Musculoskeletal:  Positive for joint pain. Negative for gout.   Gastrointestinal:  Negative for constipation, diarrhea and heartburn.   Genitourinary:  Negative for nocturia.   Neurological:  Negative for headaches and seizures.   Psychiatric/Behavioral:  Negative for depression and substance abuse. The patient is not nervous/anxious.    Allergic/Immunologic: Negative for environmental allergies.         Objective:      Physical Exam:   General: AAOx3.  No acute distress  HEENT: Normocephalic, PEARLA EOMI, Good Dentition  Neck: Supple, No JVD  Chest: Symetric, equal excursion on inspiration  Abdomen: Soft NTND  Vascular:  Pulses intact and equal bilaterally.  Capillary refill less than 3 seconds and equal bilaterally  Neurologic:  Pinprick and soft touch intact and equal bilaterally  Integment:  No ecchymosis, no errythema  Extremity:  Shoulder:  Allowable forward flexion 0/90°.  No external rotation allowed.  Active motion of the patient's shoulder intact although he stated he has pain.  Mild tenderness in the bicipital groove.  Nontender over the AC joint.  Mild tenderness with palpation shoulder.  Mild swelling.  Radiography:  Personally reviewed x-rays of the right shoulder which included a pre and postreduction completed on 12/21/2022 showed an anterior dislocation of the humeral head out of the glenoid with postreduction films showing anatomic alignment      Assessment:       Impression:      1. Right shoulder dislocation    2. Arthritis of right acromioclavicular joint    3. Acute pain of right shoulder          Plan:       1.  Discussed physical examination and radiographic findings with the patient. Fernando understands that he had a right shoulder dislocation followed by a reduction in the ER.  Treatment alternatives and outcomes were discussed with the  patient he understands he could trial conservative management such as observation, activity modification, NSAIDs, bracing, physical therapy, injections, or he could consider surgical intervention such as arthroscopy.  Recommend he trial conservative management for a little bit longer and if he fails further conservative care then consider surgical intervention.    2. Refer to physical therapy to start a post shoulder dislocation protocol.    3. Start doing home exercises such as Codman exercises these were shown to the patient.  He also understands he should do elbow exercises to keep from getting stiff.  4. No driving.    5. One-handed activities with the left hand only no lifting/pushing/pulling/climbing requiring the use of the right hand.  No activities on ladders/scaffolding/stairs.  6. Any pain can be treated with over-the-counter medications dosed per box instructions.  7. Follow up in 1 month for re-evaluation.

## 2022-12-23 NOTE — LETTER
December 23, 2022      [facility]  [address]  [place] [address]  Phone: [phone]  Fax: [phone]       Patient: Fernando Thomas   YOB: 1955  Date of Visit: 12/23/2022    To Whom It May Concern:    Andrew Thomas  was at [facility] on 12/23/2022. The patient may return to work/school on 12/27/22 with restrictions. His restrictions are as follows: No driving.  One-handed activities with the left hand only no lifting/pushing/pulling/climbing requiring the use of the right hand.  No activities on ladders/scaffolding/stairs. If you have any questions or concerns, or if I can be of further assistance, please do not hesitate to contact me.    Sincerely,        Alessia Gonzáles LPN

## 2022-12-28 ENCOUNTER — CLINICAL SUPPORT (OUTPATIENT)
Dept: REHABILITATION | Facility: HOSPITAL | Age: 67
End: 2022-12-28
Attending: ORTHOPAEDIC SURGERY
Payer: COMMERCIAL

## 2022-12-28 DIAGNOSIS — T14.8XXA DISLOCATION CLOSED: ICD-10-CM

## 2022-12-28 PROCEDURE — 97110 THERAPEUTIC EXERCISES: CPT

## 2022-12-28 PROCEDURE — 97161 PT EVAL LOW COMPLEX 20 MIN: CPT

## 2022-12-28 NOTE — PLAN OF CARE
Physical Therapy Initial Evaluation       Date: 12/28/2022  Start Time: 1:15  Stop Time: 2:00    Patient Name: Fernando Thomas  Clinic Number: [id number]  Age: 67 y.o.  Gender: male    Diagnosis:   Encounter Diagnosis   Name Primary?    Right shoulder dislocation        Referring Physician: John Campo DO  Treatment Orders: PT Eval and Treat      History     Past Medical History:   Diagnosis Date    Mixed hyperlipidemia        Current Outpatient Medications   Medication Sig    atorvastatin (LIPITOR) 80 MG tablet Take 1 tablet (80 mg total) by mouth once daily.    diazePAM (VALIUM) 2 MG tablet Take 1 tablet (2 mg total) by mouth once. for 1 dose    gabapentin (NEURONTIN) 100 MG capsule Take 1 capsule (100 mg total) by mouth 3 (three) times daily.    HYDROcodone-acetaminophen (NORCO) 5-325 mg per tablet Take 1 tablet by mouth every 4 (four) hours as needed for Pain.    omega-3 fatty acids/fish oil (FISH OIL-OMEGA-3 FATTY ACIDS) 300-1,000 mg capsule Take 2 capsules by mouth once daily.    vitamin D (VITAMIN D3) 1000 units Tab Take 1,000 Units by mouth once daily.     No current facility-administered medications for this visit.       Review of patient's allergies indicates:   Allergen Reactions    Augmentin [amoxicillin-pot clavulanate] Nausea And Vomiting    Biaxin [clarithromycin]          Subjective     History of Present Condition: fell off a ladder and dislocated my shoulder 12/21    Onset Date: 12/21/2022  Date of Surgery: 0  Precautions: One-handed activities with the left hand only no lifting/pushing/pulling/climbing requiring the use of the right hand.  No activities on ladders/scaffolding/stairs.    Mechanism of Injury: sudden    Pain Location: shoulder   Pain Description: Aching and Dull  Current Pain: 2/10  Least Pain: 0/10  Worst Pain: 7/10  Aggravating Factors: overhead activities  Relieving Factors: advil    Diagnostic Tests: x-ray  Prior Therapy:  no    Occupation: [employer], light maintenance  Job Status: active  Job Duties: maintenance    Sports/Recreational Activities: 0  Extremity Dominance: left handed    Prior Level of Function: Independent  Functional Deficits Leading to Referral/Nature of Injury: ADL's    Patient Therapy Goals: back to where I was  Cultural/Environmental/Spiritual Barriers to Treatment or Learnin      Objective         Shoulder Range of Motion:   ACTIVE ROM LEFT RIGHT   Flexion WNL 10/pain   Abduction WNL 10/pain             IR WNL WNL   ER WNL 0/pain           STRENGTH LEFT RIGHT   Flexion 5/5 pain   Abduction 5/5 pain   Extension 5/5 pain   IR 5/5 pain   ER 5/5 pain             Scapular Control/Dyskinesis:      Normal / Subtle / Obvious Comments   Left normal 0   Right normal 0         Treatment:   Shoulder shrugs x 20  Scap retraction x 20  Elbow flexion x 20  Open & close hand x 20    Functional Limitations Reports - G Codes  Category: Mobility  Tool: DASH    Score: 20/120      TEST SCORE  Modifier  Impairment Limitation Restriction    0  CH  0 % impaired, limited or restricted   1-23  CI  @ least 1% but less than 20% impaired, limited or restricted   24-47  CJ  @ least 20%<40% impaired, limited or restricted   48-71  CK  @ least 40%<60% impaired, limited or restricted   72-95  CL  @ least 60% <80% impaired, limited or restricted     CM  @ least 80%<100% impaired limited or restricted   120  CN  100% impaired, limited or restricted            Assessment     This is a 67 y.o. male referred to outpatient physical therapy and presents with a medical diagnosis of s/p right shoulder dislocation and demonstrates limitations as described in the problem list. Pt demonstrates good rehab potential. Pt will benefit from physcial therapy services in order to maximize pain free and/or functional use of right shoulder. The following goals were discussed with the patient and patient is in agreement with them as to  be addressed in the treatment plan. Pt was given a HEP consisting of pendulum exercises. Pt verbally understood the instructions as they were given and demonstrated proper form and technique during therapy. Pt was advised to perform these exercises free of pain, and to stop performing them if pain occurs.     Medical necessity is demonstrated by the following problem list:   - Pain limits function of effected part for all activities  - Unable to participate in daily activities   - Requires skilled supervision to complete and progress HEP  - Continued inability to participate in vocational pursuits    Short Term Goals (2 Weeks):  1.Pt will increase ROM to 90  2. Pt will increase strength to 3/5  3. Decrease Pain to 1/10  4. Pt demonstrates independence with postural awareness.   5. Pt demonstrates independence with HEP.     Long Term Goals (4 Weeks):  1. Pt will increase ROM to 120 to improve functional reach.   2. Pt will increase strength to 4/5 to improve tolerance to all functional activities.   3. Pt demonstrates improved function per DASH to 10 disability or less.       Plan     Pt will be treated by physical therapy 2 times a week for 6 weeks for manual therapy, therapeutic exercise, home exercise program, patient education, and modalities PRN to achieve established goals. Fernando may at times be seen by a PTA as part of the Rehab Team.     Therapist: John White, PT    I CERTIFY THE NEED FOR THESE SERVICES FURNISHED UNDER THIS PLAN OF TREATMENT AND WHILE UNDER MY CARE    Physician's comments: ________________________________________________________________________________________________________________________________________________    Physician's Name: ___________________________________

## 2023-01-03 ENCOUNTER — CLINICAL SUPPORT (OUTPATIENT)
Dept: REHABILITATION | Facility: HOSPITAL | Age: 68
End: 2023-01-03
Payer: COMMERCIAL

## 2023-01-03 DIAGNOSIS — T14.8XXA DISLOCATION CLOSED: Primary | ICD-10-CM

## 2023-01-03 PROCEDURE — 97110 THERAPEUTIC EXERCISES: CPT

## 2023-01-03 PROCEDURE — 97140 MANUAL THERAPY 1/> REGIONS: CPT

## 2023-01-03 NOTE — PROGRESS NOTES
Physical Therapy Daily Note     Name: Fernando Thomas  Clinic Number: [id number]  Diagnosis:   Encounter Diagnosis   Name Primary?    Right shoulder dislocation Yes     Physician: John Campo, DO  Precautions:  0  Visit #:  2 of 8  Time In:  8:45  Time Out:  9:15    Subjective     Pt reports: My shoulder is feeling much better today.  Pain Scale: Fernando rates pain on a scale of 0-10 to be 3 currently.    Objective     Fernando received individual therapeutic exercises to develop strength, endurance, and ROM for 15 minutes including:  Nu-step   x 8 mins  Scapular elevation  x 20  Scapular retraction  x 20  Scapular protraction  x 20    Fernando received the following manual therapy techniques: PROM were applied to the: right shoulder for 15 minutes including:  Shoulder flexion  x 20  Shoulder abd.   x 20  Shoulder IR   x 20         Written Home Exercises Provided: pendulum exercises  Pt demo good understanding of the education provided. Fernando demonstrated good return demonstration of activities.     Education provided re:  Fernando verbalized good understanding of education provided.   No spiritual or educational barriers to learning provided    Assessment     Patient tolerated treatment well with no adverse affects  This is a 67 y.o. male referred to outpatient physical therapy and presents with a medical diagnosis of s/p right shoulder dislocation and demonstrates limitations as described in the problem list. Pt prognosis is Good. Pt will continue to benefit from skilled outpatient physical therapy to address the deficits listed in the problem list, provide pt/family education and to maximize pt's level of independence in the home and community environment.     Goals as follows:  1.Pt will increase ROM to 90  2. Pt will increase strength to 3/5  3. Decrease Pain to 1/10  4. Pt demonstrates independence with postural awareness.   5. Pt demonstrates  independence with HEP.      Plan     Continue with established Plan of Care towards PT goals.    Therapist: John White, PT, DPT  1/3/2023

## 2023-01-04 ENCOUNTER — TELEPHONE (OUTPATIENT)
Dept: ORTHOPEDICS | Facility: CLINIC | Age: 68
End: 2023-01-04
Payer: COMMERCIAL

## 2023-01-04 NOTE — TELEPHONE ENCOUNTER
Returned call. I stated that Dr. Campo will re-evaluate the patient at his appointment on 2-1-23 and adjust his restriction at that time. The patient stated understanding.    ----- Message from Iván Cortes sent at 1/4/2023 10:45 AM CST -----  Regarding: pt called  Name of Who is Calling: SERGIO CARROLL [[id number]]      What is the request in detail: pt called reqesting to know when can he get his restrictions lifted for his job.Please advise      Can the clinic reply by [facility]: no      What Number to Call Back if not in [facility]:[phone]

## 2023-01-05 ENCOUNTER — CLINICAL SUPPORT (OUTPATIENT)
Dept: REHABILITATION | Facility: HOSPITAL | Age: 68
End: 2023-01-05
Payer: COMMERCIAL

## 2023-01-05 DIAGNOSIS — T14.8XXA DISLOCATION CLOSED: Primary | ICD-10-CM

## 2023-01-05 PROCEDURE — 97110 THERAPEUTIC EXERCISES: CPT

## 2023-01-05 PROCEDURE — 97140 MANUAL THERAPY 1/> REGIONS: CPT

## 2023-01-05 NOTE — PROGRESS NOTES
Physical Therapy Daily Note     Name: Fernando Thomas  Clinic Number: [id number]  Diagnosis:   Encounter Diagnosis   Name Primary?    Right shoulder dislocation Yes     Physician: John Campo, DO  Precautions:  0  Visit #:  3 of 8  Time In:  8:45  Time Out:  9:30    Subjective     Pt reports: My shoulder is feeling much better today.  Pain Scale: Fernando rates pain on a scale of 0-10 to be 3 currently.    Objective     Fernando received individual therapeutic exercises to develop strength, endurance, and ROM to right shoulder for 30 minutes including:  Nu-step   x 10 mins  Scapular elevation  x 20  Scapular retraction  x 20  Scapular protraction  x 20  Rows RTB   x 20  Cane flexion   x 20  Cane abd   x 20  Cane IR/ER   x 20    Fernando received the following manual therapy techniques: PROM were applied to the: right shoulder for 15 minutes including:  Shoulder flexion  x 20  Shoulder abd.   x 20  Shoulder IR   x 20         Written Home Exercises Provided: pendulum exercises  Pt demo good understanding of the education provided. Fernando demonstrated good return demonstration of activities.     Education provided re:  Fernando verbalized good understanding of education provided.   No spiritual or educational barriers to learning provided    Assessment     Patient tolerated treatment well with no adverse affects  This is a 67 y.o. male referred to outpatient physical therapy and presents with a medical diagnosis of s/p right shoulder dislocation and demonstrates limitations as described in the problem list. Pt prognosis is Good. Pt will continue to benefit from skilled outpatient physical therapy to address the deficits listed in the problem list, provide pt/family education and to maximize pt's level of independence in the home and community environment.     Goals as follows:  1.Pt will increase ROM to 90  2. Pt will increase strength to 3/5  3. Decrease Pain to  1/10  4. Pt demonstrates independence with postural awareness.   5. Pt demonstrates independence with HEP.      Plan     Continue with established Plan of Care towards PT goals.    Therapist: John White, PT, DPT  1/5/2023

## 2023-01-10 ENCOUNTER — CLINICAL SUPPORT (OUTPATIENT)
Dept: REHABILITATION | Facility: HOSPITAL | Age: 68
End: 2023-01-10
Payer: COMMERCIAL

## 2023-01-10 DIAGNOSIS — T14.8XXA DISLOCATION CLOSED: Primary | ICD-10-CM

## 2023-01-10 PROCEDURE — 97140 MANUAL THERAPY 1/> REGIONS: CPT

## 2023-01-10 PROCEDURE — 97110 THERAPEUTIC EXERCISES: CPT

## 2023-01-10 NOTE — PROGRESS NOTES
Physical Therapy Daily Note     Name: Fernando Thomas  Clinic Number: [id number]  Diagnosis:   Encounter Diagnosis   Name Primary?    Right shoulder dislocation Yes     Physician: John Campo,   Precautions:  0  Visit #:  4 of 8  Time In:  8:45  Time Out:  9:30    Subjective     Pt reports: My shoulder is feeling much better today.  Pain Scale: Fernando rates pain on a scale of 0-10 to be 3 currently.    Objective     Fernando received individual therapeutic exercises to develop strength, endurance, and ROM to right shoulder for 30 minutes including:  Nu-step   x 10 mins  Scapular elevation  x 20  Scapular retraction  x 20  Scapular protraction  x 20  Rows RTB   x 20  Cane flexion   x 20  Cane abd   x 20  Cane IR/ER   x 20    Fernando received the following manual therapy techniques: PROM were applied to the: right shoulder for 15 minutes including:  Shoulder flexion  x 20  Shoulder abd.   x 20  Shoulder IR   x 20         Written Home Exercises Provided: pendulum exercises, cane exercises  Pt demo good understanding of the education provided. Fernando demonstrated good return demonstration of activities.     Education provided re:  Fernando verbalized good understanding of education provided.   No spiritual or educational barriers to learning provided    Assessment     Patient tolerated treatment well with no adverse affects  This is a 67 y.o. male referred to outpatient physical therapy and presents with a medical diagnosis of s/p right shoulder dislocation and demonstrates limitations as described in the problem list. Pt prognosis is Good. Pt will continue to benefit from skilled outpatient physical therapy to address the deficits listed in the problem list, provide pt/family education and to maximize pt's level of independence in the home and community environment.     Goals as follows:  1.Pt will increase ROM to 90  2. Pt will increase strength to 3/5  3.  Decrease Pain to 1/10  4. Pt demonstrates independence with postural awareness.   5. Pt demonstrates independence with HEP.      Plan     Continue with established Plan of Care towards PT goals.    Therapist: John White, PT, DPT  1/10/2023

## 2023-01-13 ENCOUNTER — CLINICAL SUPPORT (OUTPATIENT)
Dept: REHABILITATION | Facility: HOSPITAL | Age: 68
End: 2023-01-13
Payer: COMMERCIAL

## 2023-01-13 DIAGNOSIS — T14.8XXA DISLOCATION CLOSED: Primary | ICD-10-CM

## 2023-01-13 PROCEDURE — 97110 THERAPEUTIC EXERCISES: CPT

## 2023-01-13 NOTE — PROGRESS NOTES
Physical Therapy Daily Note     Name: Fernando Thomas  Clinic Number: [id number]  Diagnosis:   Encounter Diagnosis   Name Primary?    Right shoulder dislocation Yes     Physician: John Campo,   Precautions:  0  Visit #:  5 of 8  Time In:  8:45  Time Out:  9:30    Subjective     Pt reports: My shoulder is feeling much better today.  Pain Scale: Fernando rates pain on a scale of 0-10 to be 3 currently.    Objective     Fernando received individual therapeutic exercises to develop strength, endurance, and ROM to right shoulder for 30 minutes including:  Nu-step   x 10 mins  Scapular protraction  x 20  Scapular elevation  x 20 RTB  Scapular retraction  x 20  Rows RTB   x 20  Cane flexion   x 20  Cane abd   x 20  Cane IR/ER   x 20    Fernando received the following manual therapy techniques: PROM were applied to the: right shoulder for 15 minutes including:  Shoulder flexion  x 20  Shoulder abd.   x 20  Shoulder IR   x 20         Written Home Exercises Provided: pendulum exercises, cane exercises  Pt demo good understanding of the education provided. Fernando demonstrated good return demonstration of activities.     Education provided re:  Fernando verbalized good understanding of education provided.   No spiritual or educational barriers to learning provided    Assessment     Patient tolerated treatment well with no adverse affects  This is a 67 y.o. male referred to outpatient physical therapy and presents with a medical diagnosis of s/p right shoulder dislocation and demonstrates limitations as described in the problem list. Pt prognosis is Good. Pt will continue to benefit from skilled outpatient physical therapy to address the deficits listed in the problem list, provide pt/family education and to maximize pt's level of independence in the home and community environment.     Goals as follows:  1.Pt will increase ROM to 90  2. Pt will increase strength to  3/5  3. Decrease Pain to 1/10  4. Pt demonstrates independence with postural awareness.   5. Pt demonstrates independence with HEP.      Plan     Continue with established Plan of Care towards PT goals. 4 scheduled visits remaining - Jan. 18, 20, 25, 27    Therapist: John White, PT, DPT  1/13/2023

## 2023-01-18 ENCOUNTER — CLINICAL SUPPORT (OUTPATIENT)
Dept: REHABILITATION | Facility: HOSPITAL | Age: 68
End: 2023-01-18
Payer: COMMERCIAL

## 2023-01-18 DIAGNOSIS — T14.8XXA DISLOCATION CLOSED: Primary | ICD-10-CM

## 2023-01-18 PROCEDURE — 97110 THERAPEUTIC EXERCISES: CPT

## 2023-01-18 PROCEDURE — 97140 MANUAL THERAPY 1/> REGIONS: CPT

## 2023-01-18 NOTE — PROGRESS NOTES
Physical Therapy Daily Note     Name: Fernando Thomas  Clinic Number: [id number]  Diagnosis:   Encounter Diagnosis   Name Primary?    Right shoulder dislocation Yes     Physician: John Campo,   Precautions:  0  Visit #:  5 of 8  Time In:  8:45  Time Out:  9:30    Subjective     Pt reports: My shoulder is feeling much better today.  Pain Scale: Fernando rates pain on a scale of 0-10 to be 3 currently.    Objective     Fernando received individual therapeutic exercises to develop strength, endurance, and ROM to right shoulder for 30 minutes including:  Nu-step   x 10 mins  Scapular protraction  x 20  Scapular elevation  x 20 RTB  Scapular retraction  x 20  Rows RTB   x 20  Supine shldr flexion  x 20 2# bar  Cane abd   x 20  Shldr IR/ER   x 20 1 plate    Fernando received the following manual therapy techniques: PROM were applied to the: right shoulder for 15 minutes including:  Shoulder flexion  x 20  Shoulder abd.   x 20  Shoulder IR   x 20         Written Home Exercises Provided: pendulum exercises, cane exercises  Pt demo good understanding of the education provided. Fernando demonstrated good return demonstration of activities.     Education provided re:  Fernando verbalized good understanding of education provided.   No spiritual or educational barriers to learning provided    Assessment     Patient tolerated treatment well with no adverse affects  This is a 67 y.o. male referred to outpatient physical therapy and presents with a medical diagnosis of s/p right shoulder dislocation and demonstrates limitations as described in the problem list. Pt prognosis is Good. Pt will continue to benefit from skilled outpatient physical therapy to address the deficits listed in the problem list, provide pt/family education and to maximize pt's level of independence in the home and community environment.     Goals as follows:  1.Pt will increase ROM to 90  2. Pt will  increase strength to 3/5  3. Decrease Pain to 1/10  4. Pt demonstrates independence with postural awareness.   5. Pt demonstrates independence with HEP.      Plan     Continue with established Plan of Care towards PT goals. 3 scheduled visits remaining - Jan. 20, 25, 27    Therapist: John White, PT, DPT  1/18/2023

## 2023-01-20 ENCOUNTER — CLINICAL SUPPORT (OUTPATIENT)
Dept: REHABILITATION | Facility: HOSPITAL | Age: 68
End: 2023-01-20
Payer: COMMERCIAL

## 2023-01-20 DIAGNOSIS — T14.8XXA DISLOCATION CLOSED: Primary | ICD-10-CM

## 2023-01-20 PROCEDURE — 97140 MANUAL THERAPY 1/> REGIONS: CPT

## 2023-01-20 PROCEDURE — 97110 THERAPEUTIC EXERCISES: CPT

## 2023-01-20 NOTE — PROGRESS NOTES
Physical Therapy Daily Note     Name: Fernando Thomas  Clinic Number: [id number]  Diagnosis:   Encounter Diagnosis   Name Primary?    Right shoulder dislocation Yes     Physician: John Campo,   Precautions:  0  Visit #:  7 of 8  Time In:  8:45  Time Out:  9:30    Subjective     Pt reports: My shoulder is feeling much better today.  Pain Scale: Fernando rates pain on a scale of 0-10 to be 3 currently.    Objective     Fernando received individual therapeutic exercises to develop strength, endurance, and ROM to right shoulder for 30 minutes including:  Nu-step   x 10 mins  Scapular protraction  x 20  Scapular elevation  x 20 RTB  Scapular retraction  x 20  Rows RTB   x 20  Supine shldr flexion  x 20 2# bar  Cane abd   x 20  Shldr IR/ER   x 20 1 plate    Fernando received the following manual therapy techniques: PROM were applied to the: right shoulder for 15 minutes including:  Shoulder flexion  x 20  Shoulder abd.   x 20  Shoulder IR   x 20         Written Home Exercises Provided: pendulum exercises, cane exercises  Pt demo good understanding of the education provided. Fernando demonstrated good return demonstration of activities.     Education provided re:  Fernando verbalized good understanding of education provided.   No spiritual or educational barriers to learning provided    Assessment     Patient tolerated treatment well with no adverse affects  This is a 67 y.o. male referred to outpatient physical therapy and presents with a medical diagnosis of s/p right shoulder dislocation and demonstrates limitations as described in the problem list. Pt prognosis is Good. Pt will continue to benefit from skilled outpatient physical therapy to address the deficits listed in the problem list, provide pt/family education and to maximize pt's level of independence in the home and community environment.     Goals as follows:  1.Pt will increase ROM to 90  2. Pt will  increase strength to 3/5  3. Decrease Pain to 1/10  4. Pt demonstrates independence with postural awareness.   5. Pt demonstrates independence with HEP.      Plan     Continue with established Plan of Care towards PT goals. 2 scheduled visits remaining - Jan. 25, 27    Therapist: John White, PT, DPT  1/20/2023

## 2023-01-25 ENCOUNTER — CLINICAL SUPPORT (OUTPATIENT)
Dept: REHABILITATION | Facility: HOSPITAL | Age: 68
End: 2023-01-25
Payer: COMMERCIAL

## 2023-01-25 DIAGNOSIS — T14.8XXA DISLOCATION CLOSED: Primary | ICD-10-CM

## 2023-01-25 PROCEDURE — 97110 THERAPEUTIC EXERCISES: CPT

## 2023-01-25 PROCEDURE — 97140 MANUAL THERAPY 1/> REGIONS: CPT

## 2023-01-25 NOTE — PROGRESS NOTES
Physical Therapy Daily Note     Name: Fernando Thomas  Clinic Number: [id number]  Diagnosis:   Encounter Diagnosis   Name Primary?    Right shoulder dislocation Yes     Physician: John Campo, DO  Precautions:  0  Visit #:  8 of 8  Time In:  8:45  Time Out:  9:30    Subjective     Pt reports: My shoulder is feeling a little bit sore today.  Pain Scale: Fernando rates pain on a scale of 0-10 to be 6 currently due to soreness from overworking.  It is usually a 2.    Objective     Fernando received individual therapeutic exercises to develop strength, endurance, and ROM to right shoulder for 30 minutes including:  Nu-step / UBE  x 10 mins  Scapular protraction  x 20  Scapular elevation  x 20 RTB  Scapular retraction  x 20  Rows RTB   x 20   Supine shldr flexion  x 20 2# bar  Cane abd   x 20  Shldr IR/ER   x 20 1 plate    Fernando received the following manual therapy techniques: PROM were applied to the: right shoulder for 15 minutes including:  Shoulder flexion  x 20  Shoulder abd.   x 20  Shoulder IR   x 20         Written Home Exercises Provided: pendulum exercises, cane exercises  Pt demo good understanding of the education provided. Fernando demonstrated good return demonstration of activities.     Education provided re:  Fernando verbalized good understanding of education provided.   No spiritual or educational barriers to learning provided    Assessment     Patient tolerated treatment well with no adverse affects  This is a 67 y.o. male referred to outpatient physical therapy and presents with a medical diagnosis of s/p right shoulder dislocation and demonstrates limitations as described in the problem list. Pt prognosis is Good. Pt will continue to benefit from skilled outpatient physical therapy to address the deficits listed in the problem list, provide pt/family education and to maximize pt's level of independence in the home and community environment.      Goals as follows:  1.Pt will increase ROM to 90  2. Pt will increase strength to 3/5  3. Decrease Pain to 1/10  4. Pt demonstrates independence with postural awareness.   5. Pt demonstrates independence with HEP.      Plan     Continue with established Plan of Care towards PT goals. 1 scheduled visit remaining - Jan. 27    Therapist: John White, PT, DPT  1/25/2023

## 2023-01-27 ENCOUNTER — CLINICAL SUPPORT (OUTPATIENT)
Dept: REHABILITATION | Facility: HOSPITAL | Age: 68
End: 2023-01-27
Payer: COMMERCIAL

## 2023-01-27 DIAGNOSIS — T14.8XXA DISLOCATION CLOSED: Primary | ICD-10-CM

## 2023-01-27 PROCEDURE — 97110 THERAPEUTIC EXERCISES: CPT

## 2023-01-27 PROCEDURE — 97140 MANUAL THERAPY 1/> REGIONS: CPT

## 2023-01-27 NOTE — PROGRESS NOTES
Physical Therapy Daily Note     Name: Fernando Thomas  Clinic Number: [id number]  Diagnosis:   Encounter Diagnosis   Name Primary?    Right shoulder dislocation Yes     Physician: John Campo,   Precautions:  0  Visit #:  8 of 8  Time In:  8:45  Time Out:  9:30    Subjective     Pt reports: My shoulder is feeling a little bit sore today and it feels sore at the end of each day.  Pain Scale: Fernando rates pain on a scale of 0-10 to be 6 currently due to soreness from overworking.  It is usually a 2.    Objective     Fernando received individual therapeutic exercises to develop strength, endurance, and ROM to right shoulder for 30 minutes including:  Nu-step / UBE  x 10 mins  Scapular protraction  x 20  Scapular elevation  x 20 RTB  Scapular retraction  x 20  Rows RTB   x 20   Supine shldr flexion  x 20 2#   SL abd 1#  x 20  SL Shldr ER 2#  x 20   Peg board flexion/abd x3 each    Fernando received the following manual therapy techniques: PROM were applied to the: right shoulder for 15 minutes including:  Shoulder flexion  x 20  Shoulder abd.   x 20  Shoulder IR   x 20         Written Home Exercises Provided: pendulum exercises, cane exercises  Pt demo good understanding of the education provided. Fernando demonstrated good return demonstration of activities.     Education provided re:  Fernando verbalized good understanding of education provided.   No spiritual or educational barriers to learning provided    Assessment     Patient tolerated treatment well with no adverse affects  This is a 67 y.o. male referred to outpatient physical therapy and presents with a medical diagnosis of s/p right shoulder dislocation and demonstrates limitations as described in the problem list. Pt prognosis is Good. Pt will continue to benefit from skilled outpatient physical therapy to address the deficits listed in the problem list, provide pt/family education and to maximize pt's  level of independence in the home and community environment.     Goals as follows:  1.Pt will increase ROM to 90  2. Pt will increase strength to 3/5  3. Decrease Pain to 1/10  4. Pt demonstrates independence with postural awareness.   5. Pt demonstrates independence with HEP.      Plan     Discharge physical therapy, goals met, independent with HEP, patient verbalizes understanding.    Therapist: John White, PT, DPT  1/27/2023

## 2023-02-08 ENCOUNTER — OFFICE VISIT (OUTPATIENT)
Dept: ORTHOPEDICS | Facility: CLINIC | Age: 68
End: 2023-02-08
Payer: COMMERCIAL

## 2023-02-08 VITALS — HEIGHT: 66 IN | BODY MASS INDEX: 27.95 KG/M2 | WEIGHT: 173.94 LBS | RESPIRATION RATE: 16 BRPM

## 2023-02-08 DIAGNOSIS — S46.011D TRAUMATIC INCOMPLETE TEAR OF RIGHT ROTATOR CUFF, SUBSEQUENT ENCOUNTER: ICD-10-CM

## 2023-02-08 DIAGNOSIS — M24.611 ARTHROFIBROSIS OF RIGHT SHOULDER: Primary | ICD-10-CM

## 2023-02-08 DIAGNOSIS — M19.011 ARTHRITIS OF RIGHT ACROMIOCLAVICULAR JOINT: ICD-10-CM

## 2023-02-08 DIAGNOSIS — T14.8XXA DISLOCATION CLOSED: ICD-10-CM

## 2023-02-08 DIAGNOSIS — M25.511 ACUTE PAIN OF RIGHT SHOULDER: ICD-10-CM

## 2023-02-08 PROCEDURE — 20610 LARGE JOINT ASPIRATION/INJECTION: R GLENOHUMERAL: ICD-10-PCS | Mod: RT,S$GLB,, | Performed by: ORTHOPAEDIC SURGERY

## 2023-02-08 PROCEDURE — 99213 PR OFFICE/OUTPT VISIT, EST, LEVL III, 20-29 MIN: ICD-10-PCS | Mod: 25,S$GLB,, | Performed by: ORTHOPAEDIC SURGERY

## 2023-02-08 PROCEDURE — 99213 OFFICE O/P EST LOW 20 MIN: CPT | Mod: 25,S$GLB,, | Performed by: ORTHOPAEDIC SURGERY

## 2023-02-08 PROCEDURE — 3008F BODY MASS INDEX DOCD: CPT | Mod: CPTII,S$GLB,, | Performed by: ORTHOPAEDIC SURGERY

## 2023-02-08 PROCEDURE — 1101F PR PT FALLS ASSESS DOC 0-1 FALLS W/OUT INJ PAST YR: ICD-10-PCS | Mod: CPTII,S$GLB,, | Performed by: ORTHOPAEDIC SURGERY

## 2023-02-08 PROCEDURE — 3008F PR BODY MASS INDEX (BMI) DOCUMENTED: ICD-10-PCS | Mod: CPTII,S$GLB,, | Performed by: ORTHOPAEDIC SURGERY

## 2023-02-08 PROCEDURE — 99999 PR PBB SHADOW E&M-EST. PATIENT-LVL III: CPT | Mod: PBBFAC,,, | Performed by: ORTHOPAEDIC SURGERY

## 2023-02-08 PROCEDURE — 1159F PR MEDICATION LIST DOCUMENTED IN MEDICAL RECORD: ICD-10-PCS | Mod: CPTII,S$GLB,, | Performed by: ORTHOPAEDIC SURGERY

## 2023-02-08 PROCEDURE — 3288F FALL RISK ASSESSMENT DOCD: CPT | Mod: CPTII,S$GLB,, | Performed by: ORTHOPAEDIC SURGERY

## 2023-02-08 PROCEDURE — 1125F AMNT PAIN NOTED PAIN PRSNT: CPT | Mod: CPTII,S$GLB,, | Performed by: ORTHOPAEDIC SURGERY

## 2023-02-08 PROCEDURE — 99999 PR PBB SHADOW E&M-EST. PATIENT-LVL III: ICD-10-PCS | Mod: PBBFAC,,, | Performed by: ORTHOPAEDIC SURGERY

## 2023-02-08 PROCEDURE — 1125F PR PAIN SEVERITY QUANTIFIED, PAIN PRESENT: ICD-10-PCS | Mod: CPTII,S$GLB,, | Performed by: ORTHOPAEDIC SURGERY

## 2023-02-08 PROCEDURE — 1101F PT FALLS ASSESS-DOCD LE1/YR: CPT | Mod: CPTII,S$GLB,, | Performed by: ORTHOPAEDIC SURGERY

## 2023-02-08 PROCEDURE — 3288F PR FALLS RISK ASSESSMENT DOCUMENTED: ICD-10-PCS | Mod: CPTII,S$GLB,, | Performed by: ORTHOPAEDIC SURGERY

## 2023-02-08 PROCEDURE — 1159F MED LIST DOCD IN RCRD: CPT | Mod: CPTII,S$GLB,, | Performed by: ORTHOPAEDIC SURGERY

## 2023-02-08 PROCEDURE — 20610 DRAIN/INJ JOINT/BURSA W/O US: CPT | Mod: RT,S$GLB,, | Performed by: ORTHOPAEDIC SURGERY

## 2023-02-08 RX ORDER — TRIAMCINOLONE ACETONIDE 40 MG/ML
40 INJECTION, SUSPENSION INTRA-ARTICULAR; INTRAMUSCULAR
Status: DISCONTINUED | OUTPATIENT
Start: 2023-02-08 | End: 2023-02-08 | Stop reason: HOSPADM

## 2023-02-08 RX ADMIN — TRIAMCINOLONE ACETONIDE 40 MG: 40 INJECTION, SUSPENSION INTRA-ARTICULAR; INTRAMUSCULAR at 03:02

## 2023-02-08 NOTE — PROGRESS NOTES
Subjective:      Patient ID: Fernando Thomas is a 67 y.o. male.    Chief Complaint: Pain and Follow-up of the Right Shoulder      HPI:  Mr. Thomas returns today for re-evaluation of his right shoulder.  He was going to physical/occupational therapy but has been discharged from their care.  He was last seen on 12/23/2022 for evaluation and treatment of a right shoulder dislocation which began on 12/21/2022 after he fell 7 ft off of a ladder.  He has been doing intermittent home exercises with some help but has not been pushing himself because when he gets done with work his shoulder hurts.  He has been going to work but primarily doing work at shoulder level or lower.  Overhead activity still cause him some pain.  He has been doing wall walking while in the shower and the heat helps to gain some motion.    ROS:  No new diagnosis/surgery/prescriptions since last office visit on 12/23/2022.  Constitutional: Negative for chills and fever.   HENT:  Negative for congestion.    Eyes:  Negative for blurred vision and double vision.   Cardiovascular:  Negative for chest pain and cyanosis.   Respiratory:  Negative for cough and shortness of breath.    Endocrine: Negative for cold intolerance and polydipsia.   Hematologic/Lymphatic: Negative for adenopathy.   Skin:  Negative for flushing, itching and skin cancer.   Musculoskeletal:  Positive for joint pain. Negative for gout.   Gastrointestinal:  Negative for constipation, diarrhea and heartburn.   Genitourinary:  Negative for nocturia.   Neurological:  Negative for headaches and seizures.   Psychiatric/Behavioral:  Negative for depression and substance abuse. The patient is not nervous/anxious.    Allergic/Immunologic: Negative for environmental allergies.       Objective:      Physical Exam:   General: AAOx3.  No acute distress  Vascular:  Pulses intact and equal bilaterally.  Capillary refill less than 3 seconds and equal bilaterally  Neurologic:  Pinprick and soft  touch intact and equal bilaterally  Integment:  No ecchymosis, no errythema  Extremity:  Shoulder: Forward flexion/abduction right shoulder active 0/95 degrees, passive 0/135 degrees, left shoulder 0/170 degrees.  Internal rotation right shoulder L3, left shoulder T10.  Negative drop-arm bilaterally.  Negative lift-off bilaterally.  External rotation right shoulder 10/5 degrees, left shoulder 0/15°.  Full can mildly positive right shoulder.  Empty can positive right shoulder.  Pedersen/Neer positive right shoulder.  Cross-arm negative both shoulders.  Nontender in the bicipital groove bilaterally.  Yergason's negative bilaterally.  Apprehension/relocation positive right shoulder.  Radiography:  No new x-rays done today.      Assessment:       Impression:     1. Arthrofibrosis, right shoulder    2. History of right shoulder dislocation    3. AC arthritis, right shoulder   4.   5.  Incomplete rotator cuff tear, right shoulder.    Right shoulder pain.         Plan:       1.  Discussed physical examination with the patient. Fernando understands that he is developing arthrofibrosis of his right shoulder due to his dislocation and also disuse due to tenderness.  Discussed with the patient he needs to get very aggressive with his shoulder motion or else he may need to have a manipulation under anesthesia in the future.  Treatment alternatives and outcomes were discussed with the patient he could continue with conservative management such as observation, activity modification, NSAIDs, bracing, physical therapy, injections or he could consider surgical intervention such as manipulation under anesthesia or arthroscopy.  Recommend he trial conservative management a little bit longer before considering surgery.    2. Offered a steroid injection to the right shoulder, he elected to proceed.  3. Refer back to physical therapy to start an aggressive shoulder motion exercise program.  4. Start doing home exercises such as wall  walking exercises, cane exercises, Codman exercises, towel exercises, and pulley exercises.  These were shown discussed with the patient.  5. A pamphlet on home exercises were given to the patient.    6. A prescription for the patient to obtain a pulley for home use was prepared for him.    7. Any pain can be treated with over-the-counter medications dosed per box instructions.  He should also use NSAIDs as tolerated allowed by PCM.    8. May return to work with activities as tolerated he can do overhead activities as tolerated and drive as tolerated.    9. Follow up in 6 weeks for re-evaluation if he has not improved by next visit may discuss possible surgical intervention such as manipulation under anesthesia and also get an MRI to evaluate the rotator cuff for possible arthroscopy.

## 2023-02-08 NOTE — LETTER
February 8, 2023      [facility]  [address] [place] [address]  Phone: [phone]  Fax: [phone]       Patient: Fernando Thomas   YOB: 1955  Date of Visit: 02/08/2023    To Whom It May Concern:    Andrew Thomas  was at [facility] on 02/08/2023. The patient may return to work/school on 2/9/23 with restrictions. His restrictions are as follows: may return to work with activities as tolerated he can do overhead activities as tolerated and drive as toleratedIf you have any questions or concerns, or if I can be of further assistance, please do not hesitate to contact me.    Sincerely,        Alessia Gonzáles LPN

## 2023-02-08 NOTE — PROCEDURES
Large Joint Aspiration/Injection: R glenohumeral    Date/Time: 2/8/2023 3:15 PM  Performed by: John Campo DO  Authorized by: John Campo, DO     Consent Done?:  Yes (Verbal)  Indications:  Diagnostic evaluation and pain  Site marked: the procedure site was marked    Timeout: prior to procedure the correct patient, procedure, and site was verified    Prep: patient was prepped and draped in usual sterile fashion      Details:  Needle Size:  22 G  Ultrasonic Guidance for needle placement?: No    Approach:  Posterior  Location:  Shoulder  Site:  R glenohumeral  Medications:  40 mg triamcinolone acetonide 40 mg/mL  Patient tolerance:  Patient tolerated the procedure well with no immediate complications

## 2023-02-14 ENCOUNTER — CLINICAL SUPPORT (OUTPATIENT)
Dept: REHABILITATION | Facility: HOSPITAL | Age: 68
End: 2023-02-14
Attending: ORTHOPAEDIC SURGERY
Payer: COMMERCIAL

## 2023-02-14 DIAGNOSIS — T14.8XXA DISLOCATION CLOSED: Primary | ICD-10-CM

## 2023-02-14 PROCEDURE — 97110 THERAPEUTIC EXERCISES: CPT

## 2023-02-14 PROCEDURE — 97140 MANUAL THERAPY 1/> REGIONS: CPT

## 2023-02-14 NOTE — PROGRESS NOTES
Physical Therapy Daily Note     Name: Fernando Thomas  Clinic Number: [id number]  Diagnosis:   Encounter Diagnosis   Name Primary?    Right shoulder dislocation Yes     Physician: John Campo,   Precautions:  0  Visit #:  9 of 12  Time In:  3:30  Time Out:  4:15    Subjective     Pt reports: After re-evaluation with doctor, he began exercising more at home and reports he knows he needs to get his strength up. Stated the steroid injection has been helping.   Pain Scale: Fernando rates pain on a scale of 0-10 to be 2 currently due to soreness from overworking.  It is usually a 2.    Objective     Fernando received individual therapeutic exercises to develop strength, endurance, and ROM to right shoulder for 30 minutes including:  Nu-step / UBE  x 10 mins  Scapular protraction  x 20  Rows RTB   x 20   Supine shldr flexion  x 20 2#   SL abd 1#  x 20  SL Shldr ER 2#  x 20   PNF wand flexion  x 20  Peg board flexion/abd x 3 each    Fernando received the following manual therapy techniques: PROM were applied to the: right shoulder for 15 minutes including:  Shoulder flexion  x 20  Shoulder abd.   x 20  Shoulder IR   x 20         Written Home Exercises Provided: pendulum exercises, cane exercises  Pt demo good understanding of the education provided. Fernando demonstrated good return demonstration of activities.     Education provided re:  Fernando verbalized good understanding of education provided.   No spiritual or educational barriers to learning provided    Assessment     Patient tolerated treatment well with no adverse affects  This is a 67 y.o. male referred to outpatient physical therapy and presents with a medical diagnosis of s/p right shoulder dislocation and demonstrates limitations as described in the problem list. Pt prognosis is Good. Pt will continue to benefit from skilled outpatient physical therapy to address the deficits listed in the problem list,  provide pt/family education and to maximize pt's level of independence in the home and community environment.     Goals as follows:  1.Pt will increase ROM to 90  2. Pt will increase strength to 3/5  3. Decrease Pain to 1/10  4. Pt demonstrates independence with postural awareness.   5. Pt demonstrates independence with HEP.      Plan     Continue with plan of care to increase strength, ROM, flexibility, and endurance of R shoulder.     Therapist: John White, PT, DPT  2/14/2023

## 2023-02-16 ENCOUNTER — CLINICAL SUPPORT (OUTPATIENT)
Dept: REHABILITATION | Facility: HOSPITAL | Age: 68
End: 2023-02-16
Attending: ORTHOPAEDIC SURGERY
Payer: COMMERCIAL

## 2023-02-16 DIAGNOSIS — T14.8XXA DISLOCATION CLOSED: Primary | ICD-10-CM

## 2023-02-16 PROCEDURE — 97110 THERAPEUTIC EXERCISES: CPT

## 2023-02-16 PROCEDURE — 97140 MANUAL THERAPY 1/> REGIONS: CPT

## 2023-02-16 NOTE — PROGRESS NOTES
Physical Therapy Daily Note     Name: Fernando Thomas  Clinic Number: [id number]  Diagnosis:   Encounter Diagnosis   Name Primary?    Right shoulder dislocation Yes     Physician: John Campo,   Precautions:  0  Visit #:  10 of 12  Time In:  3:30  Time Out:  4:15    Subjective     Pt reports: After re-evaluation with doctor, he began exercising more at home and reports he knows he needs to get his strength up. Stated the steroid injection has been helping but is starting to feel like it's wearing off.    Pain Scale: Fernando rates pain on a scale of 0-10 to be 2 currently due to soreness from overworking.  It is usually a 2.    Objective     Fernando received individual therapeutic exercises to develop strength, endurance, and ROM to right shoulder for 30 minutes including:  Nu-step / UBE  x 10 mins  Scapular protraction  x 20  Rows RTB   x 20   Supine shldr flexion  x 20 2#   SL abd 1#  x 20  SL Shldr ER 2#  x 20   PNF wand flexion  x 20  Peg board flexion/abd x 3 each    Fernando received the following manual therapy techniques: PROM were applied to the: right shoulder for 15 minutes including:  Shoulder flexion  x 20  Shoulder abd.   x 20  Shoulder IR   x 20         Written Home Exercises Provided: pendulum exercises, cane exercises  Pt demo good understanding of the education provided. Fernando demonstrated good return demonstration of activities.     Education provided re:  Fernando verbalized good understanding of education provided.   No spiritual or educational barriers to learning provided    Assessment     Patient tolerated treatment well with no adverse affects  This is a 67 y.o. male referred to outpatient physical therapy and presents with a medical diagnosis of s/p right shoulder dislocation and demonstrates limitations as described in the problem list. Pt prognosis is Good. Pt will continue to benefit from skilled outpatient physical therapy to  address the deficits listed in the problem list, provide pt/family education and to maximize pt's level of independence in the home and community environment.     Goals as follows:  1.Pt will increase ROM to 90  2. Pt will increase strength to 3/5  3. Decrease Pain to 1/10  4. Pt demonstrates independence with postural awareness.   5. Pt demonstrates independence with HEP.      Plan     Continue with plan of care to increase strength, ROM, flexibility, and endurance of R shoulder.     Therapist: John White, PT, DPT  2/16/2023

## 2023-02-24 ENCOUNTER — CLINICAL SUPPORT (OUTPATIENT)
Dept: REHABILITATION | Facility: HOSPITAL | Age: 68
End: 2023-02-24
Attending: ORTHOPAEDIC SURGERY
Payer: COMMERCIAL

## 2023-02-24 DIAGNOSIS — M24.611 ARTHROFIBROSIS OF RIGHT SHOULDER: ICD-10-CM

## 2023-02-24 DIAGNOSIS — M19.011 ARTHRITIS OF RIGHT ACROMIOCLAVICULAR JOINT: ICD-10-CM

## 2023-02-24 DIAGNOSIS — T14.8XXA DISLOCATION CLOSED: ICD-10-CM

## 2023-02-24 PROCEDURE — 97140 MANUAL THERAPY 1/> REGIONS: CPT

## 2023-02-24 PROCEDURE — 97110 THERAPEUTIC EXERCISES: CPT

## 2023-02-24 NOTE — PROGRESS NOTES
Physical Therapy Daily Note     Name: Fernando Thomas  Clinic Number: [id number]  Diagnosis:   Encounter Diagnoses   Name Primary?    Arthrofibrosis of right shoulder     Right shoulder dislocation     Arthritis of right acromioclavicular joint      Physician: John Campo, DO  Precautions:  0  Visit #:  11 of 12  Time In:  3:30  Time Out:  4:30    Subjective     Pt reports: After re-evaluation with doctor, he began exercising more at home and reports he knows he needs to get his strength up. Stated the steroid injection has been helping but is starting to feel like it's wearing off.    Pain Scale: Fernando rates pain on a scale of 0-10 to be 2 currently due to soreness from overworking.  It is usually a 2.    Objective     Fernando received individual therapeutic exercises to develop strength, endurance, and ROM to right shoulder for 40 minutes including:  Nu-step / UBE  x 10 mins  Scapular protraction  x 20  Rows RTB   x 20   Supine shldr flexion  x 20 2#   SL abd 1#  x 20  SL Shldr ER 2#  x 20   PNF wand flexion  x 20  Peg board flexion/abd x 3 each    Fernando received the following manual therapy techniques: PROM were applied to the: right shoulder for 20 minutes including:  Shoulder flexion  x 20  Shoulder abd.   x 20  Shoulder IR   x 20         Written Home Exercises Provided: pendulum exercises, cane exercises  Pt demo good understanding of the education provided. Fernando demonstrated good return demonstration of activities.     Education provided re:  Fernando verbalized good understanding of education provided.   No spiritual or educational barriers to learning provided    Assessment     Patient tolerated treatment well with no adverse affects  This is a 67 y.o. male referred to outpatient physical therapy and presents with a medical diagnosis of s/p right shoulder dislocation and demonstrates limitations as described in the problem list. Pt prognosis is  Good. Pt will continue to benefit from skilled outpatient physical therapy to address the deficits listed in the problem list, provide pt/family education and to maximize pt's level of independence in the home and community environment.     Goals as follows:  1.Pt will increase ROM to 90  2. Pt will increase strength to 3/5  3. Decrease Pain to 1/10  4. Pt demonstrates independence with postural awareness.   5. Pt demonstrates independence with HEP.      Plan     Continue with plan of care to increase strength, ROM, flexibility, and endurance of R shoulder.     Therapist: John White, PT, DPT  2/24/2023

## 2023-02-28 ENCOUNTER — CLINICAL SUPPORT (OUTPATIENT)
Dept: REHABILITATION | Facility: HOSPITAL | Age: 68
End: 2023-02-28
Attending: ORTHOPAEDIC SURGERY
Payer: COMMERCIAL

## 2023-02-28 DIAGNOSIS — T14.8XXA DISLOCATION CLOSED: Primary | ICD-10-CM

## 2023-02-28 PROCEDURE — 97110 THERAPEUTIC EXERCISES: CPT

## 2023-02-28 PROCEDURE — 97140 MANUAL THERAPY 1/> REGIONS: CPT

## 2023-02-28 PROCEDURE — 97014 ELECTRIC STIMULATION THERAPY: CPT

## 2023-02-28 NOTE — PROGRESS NOTES
Physical Therapy Daily Note     Name: Fernando Thomas  Clinic Number: [id number]  Diagnosis:   Encounter Diagnosis   Name Primary?    Right shoulder dislocation Yes     Physician: John Campo, DO  Precautions:  0  Visit #:  12 of 12  Time In:  3:00  Time Out:  4:00    Subjective     Pt reports: After re-evaluation with doctor, he began exercising more at home and reports he knows he needs to get his strength up. Stated the steroid injection has been helping but is starting to feel like it's wearing off.    Pain Scale: Fernando rates pain on a scale of 0-10 to be 2 currently due to soreness from overworking.  It is usually a 2.    Objective     Fernando received individual therapeutic exercises to develop strength, endurance, and ROM to right shoulder for 40 minutes including:  Nu-step / UBE  x 10 mins  Scapular protraction  x 20  Rows RTB   x 20   Supine shldr flexion  x 20 2#   SL abd 1#  x 20  SL Shldr ER 2#  x 20   PNF wand flexion  x 20  Peg board flexion/abd x 3 each    Fernando received the following manual therapy techniques: PROM were applied to the: right shoulder for 20 minutes including:  Shoulder flexion  x 20  Shoulder abd.   x 20  Shoulder IR   x 20     Fernando received ESTIM with heat to R shoulder to decrease pain for 15 minutes      Written Home Exercises Provided: pendulum exercises, cane exercises  Pt demo good understanding of the education provided. Fernando demonstrated good return demonstration of activities.     Education provided re:  Fernando verbalized good understanding of education provided.   No spiritual or educational barriers to learning provided    Assessment     Patient tolerated treatment well with no adverse affects  This is a 67 y.o. male referred to outpatient physical therapy and presents with a medical diagnosis of s/p right shoulder dislocation and demonstrates limitations as described in the problem list. Pt prognosis is  Good. Pt will continue to benefit from skilled outpatient physical therapy to address the deficits listed in the problem list, provide pt/family education and to maximize pt's level of independence in the home and community environment.     Goals as follows:  1.Pt will increase ROM to 90  2. Pt will increase strength to 3/5  3. Decrease Pain to 1/10  4. Pt demonstrates independence with postural awareness.   5. Pt demonstrates independence with HEP.      Plan     Continue with plan of care to increase strength, ROM, flexibility, and endurance of R shoulder. 5 scheduled remaining visits - Mar. 3, 7, 10, 13, 17    Therapist: John White, PT, DPT  2/28/2023

## 2023-03-03 ENCOUNTER — CLINICAL SUPPORT (OUTPATIENT)
Dept: REHABILITATION | Facility: HOSPITAL | Age: 68
End: 2023-03-03
Attending: ORTHOPAEDIC SURGERY
Payer: COMMERCIAL

## 2023-03-03 DIAGNOSIS — T14.8XXA DISLOCATION CLOSED: Primary | ICD-10-CM

## 2023-03-03 PROCEDURE — 97110 THERAPEUTIC EXERCISES: CPT

## 2023-03-03 PROCEDURE — 97140 MANUAL THERAPY 1/> REGIONS: CPT

## 2023-03-03 NOTE — PROGRESS NOTES
Physical Therapy Daily Note     Name: Fernando Thomas  Clinic Number: [id number]  Diagnosis:   Encounter Diagnosis   Name Primary?    Right shoulder dislocation Yes     Physician: John Campo,   Precautions:  0  Visit #:  12 of 12  Time In:  3:30  Time Out:  4:30    Subjective     Pt reports: After re-evaluation with doctor, he began exercising more at home and reports he knows he needs to get his strength up. Stated the steroid injection has been helping but is starting to feel like it's wearing off.    Pain Scale: Fernando rates pain on a scale of 0-10 to be 2 currently due to soreness from overworking.  It is usually a 2.    Objective     Fernando received individual therapeutic exercises to develop strength, endurance, and ROM to right shoulder for 45 minutes including:  Nu-step / UBE  x 10 mins  Scapular protraction  x 20 2#  Supine shldr press X 20 2#  Supine shldr flexion  x 20 2#   Rows RTB   x 20   SL abd 1#  x 20  SL Shldr ER 2#  x 20   PNF wand flexion  x 20  Peg board flexion/abd x 3 each    Fernando received the following manual therapy techniques: PROM with oscillations were applied to the: right shoulder for 15 minutes including:  Shoulder flexion  x 20  Shoulder abd.   x 20  Shoulder IR   x 20     Fernando received ESTIM with heat to R shoulder to decrease pain for 15 minutes no estim this date 3/3/2023      Written Home Exercises Provided: pendulum exercises, cane exercises  Pt demo good understanding of the education provided. Fernando demonstrated good return demonstration of activities.     Education provided re:  Fernando verbalized good understanding of education provided.   No spiritual or educational barriers to learning provided    Assessment     Patient tolerated treatment well with no adverse affects  This is a 67 y.o. male referred to outpatient physical therapy and presents with a medical diagnosis of s/p right shoulder dislocation  and demonstrates limitations as described in the problem list. Pt prognosis is Good. Pt will continue to benefit from skilled outpatient physical therapy to address the deficits listed in the problem list, provide pt/family education and to maximize pt's level of independence in the home and community environment.     Goals as follows:  1.Pt will increase ROM to 90  2. Pt will increase strength to 3/5  3. Decrease Pain to 1/10  4. Pt demonstrates independence with postural awareness.   5. Pt demonstrates independence with HEP.      Plan     Continue with plan of care to increase strength, ROM, flexibility, and endurance of R shoulder. 4 scheduled remaining visits - Mar. 7, 10, 13, 17    Therapist: John White, PT, DPT  3/3/2023

## 2023-03-07 ENCOUNTER — CLINICAL SUPPORT (OUTPATIENT)
Dept: REHABILITATION | Facility: HOSPITAL | Age: 68
End: 2023-03-07
Attending: ORTHOPAEDIC SURGERY
Payer: COMMERCIAL

## 2023-03-07 DIAGNOSIS — T14.8XXA DISLOCATION CLOSED: Primary | ICD-10-CM

## 2023-03-07 PROCEDURE — 97140 MANUAL THERAPY 1/> REGIONS: CPT

## 2023-03-07 PROCEDURE — 97110 THERAPEUTIC EXERCISES: CPT

## 2023-03-07 NOTE — PROGRESS NOTES
Physical Therapy Daily Note     Name: Fernando Thomas  Clinic Number: [id number]  Diagnosis:   Encounter Diagnosis   Name Primary?    Right shoulder dislocation Yes     Physician: John Campo,   Precautions:  0  Visit #:  13 of 12  Time In:  3:00  Time Out:  4:00    Subjective     Pt reports: After re-evaluation with doctor, he began exercising more at home and reports he knows he needs to get his strength up. Stated the steroid injection has been helping but is starting to feel like it's wearing off.    Pain Scale: Fernando rates pain on a scale of 0-10 to be 2 currently due to soreness from overworking.  It is usually a 2.    Objective     Fernando received individual therapeutic exercises to develop strength, endurance, and ROM to right shoulder for 45 minutes including:  Nu-step / UBE  x 10 mins  Scapular protraction  x 20 4#  Supine shldr press X 30 4#  Supine shldr flexion  x 20 4#   SL abd   x 20 2#  SL Shldr ER   x 20 2#  Rows GTB   x 20   PNF wand flexion  x 20  Peg board flexion/abd x 3 each    Fernando received the following manual therapy techniques: PROM with oscillations were applied to the: right shoulder for 15 minutes including:  Shoulder flexion  x 20  Shoulder abd.   x 20  Shoulder IR   x 20           Written Home Exercises Provided: pendulum exercises, cane exercises  Pt demo good understanding of the education provided. Fernando demonstrated good return demonstration of activities.     Education provided re:  Fernando verbalized good understanding of education provided.   No spiritual or educational barriers to learning provided    Assessment     Patient tolerated treatment well with no adverse affects  This is a 67 y.o. male referred to outpatient physical therapy and presents with a medical diagnosis of s/p right shoulder dislocation and demonstrates limitations as described in the problem list. Pt prognosis is Good. Pt will continue to  benefit from skilled outpatient physical therapy to address the deficits listed in the problem list, provide pt/family education and to maximize pt's level of independence in the home and community environment.     Goals as follows:  1.Pt will increase ROM to 90  2. Pt will increase strength to 3/5  3. Decrease Pain to 1/10  4. Pt demonstrates independence with postural awareness.   5. Pt demonstrates independence with HEP.      Plan     Continue with plan of care to increase strength, ROM, flexibility, and endurance of R shoulder. 3 scheduled remaining visits - Mar. 10, 13, 17    Therapist: John White, PT, DPT  3/7/2023

## 2023-03-10 ENCOUNTER — CLINICAL SUPPORT (OUTPATIENT)
Dept: REHABILITATION | Facility: HOSPITAL | Age: 68
End: 2023-03-10
Attending: ORTHOPAEDIC SURGERY
Payer: COMMERCIAL

## 2023-03-10 DIAGNOSIS — T14.8XXA DISLOCATION CLOSED: Primary | ICD-10-CM

## 2023-03-10 PROCEDURE — 97140 MANUAL THERAPY 1/> REGIONS: CPT

## 2023-03-10 PROCEDURE — 97110 THERAPEUTIC EXERCISES: CPT

## 2023-03-10 NOTE — PROGRESS NOTES
Physical Therapy Daily Note     Name: Fernando Thomas  Clinic Number: [id number]  Diagnosis:   Encounter Diagnosis   Name Primary?    Right shoulder dislocation Yes     Physician: John Campo,   Precautions:  0  Visit #:  14 of 12  Time In:  3:30  Time Out:  4:30    Subjective     Pt reports: After re-evaluation with doctor, he began exercising more at home and reports he knows he needs to get his strength up. Stated the steroid injection has been helping but is starting to feel like it's wearing off.    Pain Scale: Fernando rates pain on a scale of 0-10 to be 2 currently due to soreness from overworking.  It is usually a 2.    Objective     Fernando received individual therapeutic exercises to develop strength, endurance, and ROM to right shoulder for 45 minutes including:  Nu-step / UBE  x 10 mins  Scapular protraction  x 20 4#  Supine shldr press X 30 4#  Supine shldr flexion  x 20 4#   SL abd   x 20 2#  SL Shldr ER   x 20 2#  Rows GTB   x 20   PNF wand flexion  x 20  Peg board flexion/abd x 3 each    Fernando received the following manual therapy techniques: PROM with oscillations were applied to the: right shoulder for 15 minutes including:  Shoulder flexion  x 20  Shoulder abd.   x 20  Shoulder IR   x 20           Written Home Exercises Provided: pendulum exercises, cane exercises  Pt demo good understanding of the education provided. Fernando demonstrated good return demonstration of activities.     Education provided re:  Fernando verbalized good understanding of education provided.   No spiritual or educational barriers to learning provided    Assessment     Patient tolerated treatment well with no adverse affects  This is a 67 y.o. male referred to outpatient physical therapy and presents with a medical diagnosis of s/p right shoulder dislocation and demonstrates limitations as described in the problem list. Pt prognosis is Good. Pt will continue to  benefit from skilled outpatient physical therapy to address the deficits listed in the problem list, provide pt/family education and to maximize pt's level of independence in the home and community environment.     Goals as follows:  1.Pt will increase ROM to 90  2. Pt will increase strength to 3/5  3. Decrease Pain to 1/10  4. Pt demonstrates independence with postural awareness.   5. Pt demonstrates independence with HEP.      Plan     Continue with plan of care to increase strength, ROM, flexibility, and endurance of R shoulder. 2 scheduled remaining visits - Mar. 13, 17    Therapist: John White, PT, DPT  3/10/2023

## 2023-03-13 ENCOUNTER — CLINICAL SUPPORT (OUTPATIENT)
Dept: REHABILITATION | Facility: HOSPITAL | Age: 68
End: 2023-03-13
Attending: ORTHOPAEDIC SURGERY
Payer: COMMERCIAL

## 2023-03-13 DIAGNOSIS — T14.8XXA DISLOCATION CLOSED: Primary | ICD-10-CM

## 2023-03-13 PROCEDURE — 97140 MANUAL THERAPY 1/> REGIONS: CPT

## 2023-03-13 PROCEDURE — 97110 THERAPEUTIC EXERCISES: CPT

## 2023-03-13 NOTE — PROGRESS NOTES
Physical Therapy Daily Note     Name: Fernando Thomas  Clinic Number: [id number]  Diagnosis:   Encounter Diagnosis   Name Primary?    Right shoulder dislocation Yes     Physician: John Campo, DO  Precautions:  0  Visit #:  17   Time In:  3:30  Time Out:  4:30    Subjective     Pt reports: After re-evaluation with doctor, he began exercising more at home and reports he knows he needs to get his strength up. Stated the steroid injection has been helping but is starting to feel like it's wearing off.    Pain Scale: Fernando rates pain on a scale of 0-10 to be 2 currently due to soreness from overworking.  It is usually a 2.    Objective     Fernando received individual therapeutic exercises to develop strength, endurance, and ROM to right shoulder for 45 minutes including:  UBE   x 10 mins  Scapular protraction  x 30 4#  Supine shldr press x 30 4#  Supine shldr flexion  x 30 4#   SL abd   x 20 2#  SL Shldr ER   x 20 2#  Rows GTB   x 20   PNF wand flexion  x 20  Peg board flexion/abd x 3 each    Fernando received the following manual therapy techniques: PROM with oscillations were applied to the: right shoulder for 15 minutes including:  Shoulder flexion  x 20  Shoulder abd.   x 20  Shoulder IR   x 20           Written Home Exercises Provided: pendulum exercises, cane exercises  Pt demo good understanding of the education provided. Fernando demonstrated good return demonstration of activities.     Education provided re:  Fernando verbalized good understanding of education provided.   No spiritual or educational barriers to learning provided    Assessment     Patient tolerated treatment well with no adverse affects  This is a 67 y.o. male referred to outpatient physical therapy and presents with a medical diagnosis of s/p right shoulder dislocation and demonstrates limitations as described in the problem list. Pt prognosis is Good. Pt will continue to benefit from  skilled outpatient physical therapy to address the deficits listed in the problem list, provide pt/family education and to maximize pt's level of independence in the home and community environment.     Goals as follows:  1.Pt will increase ROM to 90  2. Pt will increase strength to 3/5  3. Decrease Pain to 1/10  4. Pt demonstrates independence with postural awareness.   5. Pt demonstrates independence with HEP.      Plan     Continue with plan of care to increase strength, ROM, flexibility, and endurance of R shoulder. 1 scheduled remaining visits - Mar. 17    Therapist: John White PT, DPT  3/13/2023

## 2023-03-17 ENCOUNTER — CLINICAL SUPPORT (OUTPATIENT)
Dept: REHABILITATION | Facility: HOSPITAL | Age: 68
End: 2023-03-17
Attending: ORTHOPAEDIC SURGERY
Payer: COMMERCIAL

## 2023-03-17 DIAGNOSIS — T14.8XXA DISLOCATION CLOSED: Primary | ICD-10-CM

## 2023-03-17 PROCEDURE — 97110 THERAPEUTIC EXERCISES: CPT

## 2023-03-17 PROCEDURE — 97140 MANUAL THERAPY 1/> REGIONS: CPT

## 2023-03-17 NOTE — PROGRESS NOTES
Physical Therapy Daily Note     Name: Fernando Thomas  Clinic Number: [id number]  Diagnosis:   Encounter Diagnosis   Name Primary?    Right shoulder dislocation Yes     Physician: John Campo, DO  Precautions:  0  Visit #:  18   Time In:  3:30  Time Out:  4:15    Subjective     Pt reports: My shoulder was really hurting on Wed., I don't now what I did.    Pain Scale: Fernando rates pain on a scale of 0-10 to be 2 currently due to soreness from overworking.  It is usually a 2.    Objective     Fernando received individual therapeutic exercises to develop strength, endurance, and ROM to right shoulder for 30 minutes including:  UBE   x 12 mins  Scapular protraction  x 30 4# dumbbell  Supine shldr press x 30 4# bar  Supine shldr flexion  x 30 4# bar n/c increased pain  SL abd   x 20 2# n/c increased pain  SL Shldr ER   x 20 2# n/c increased pain  Rows GTB   x 20   PNF wand flexion  x 20  Peg board flexion/abd x 3 each    Fernando received the following manual therapy techniques: PROM with oscillations were applied to the: right shoulder for 15 minutes including:  Shoulder flexion  x 20  Shoulder abd.   x 20  Shoulder IR   x 20           Written Home Exercises Provided: pendulum exercises, cane exercises  Pt demo good understanding of the education provided. Fernando demonstrated good return demonstration of activities.     Education provided re:  Fernando verbalized good understanding of education provided.   No spiritual or educational barriers to learning provided    Assessment     Patient tolerated treatment well with no adverse affects  This is a 67 y.o. male referred to outpatient physical therapy and presents with a medical diagnosis of s/p right shoulder dislocation and demonstrates limitations as described in the problem list. Pt prognosis is Good. Pt will continue to benefit from skilled outpatient physical therapy to address the deficits listed in the  problem list, provide pt/family education and to maximize pt's level of independence in the home and community environment.     Shoulder flexion 136, abduction 150, ER 50, IR 50    Goals as follows:  1.Pt will increase ROM to 90 MET  2. Pt will increase strength to 3/5 MET  3. Decrease Pain to 1/10  4. Pt demonstrates independence with postural awareness. MET  5. Pt demonstrates independence with HEP. MET     Plan     Discharge from PT, 4/5 goals met, patient independent with HEP, verbalizes understanding.    Therapist: John White, PT, DPT  3/17/2023

## 2023-03-20 ENCOUNTER — TELEPHONE (OUTPATIENT)
Dept: ORTHOPEDICS | Facility: CLINIC | Age: 68
End: 2023-03-20
Payer: COMMERCIAL

## 2023-03-20 NOTE — TELEPHONE ENCOUNTER
Returned call. I stated I would speak with Dr. Campo about updated physical therapy orders tomorrow 3/21/23 when he is in clinic. I stated I would call the patient back tomorrow to give him an update on the the status of physical therapy order. The patient stated understanding. He stated that he felt his range of motion has gone from 70 to 80 percent to about 50 percent of pre-injury. I stated understanding.    ----- Message from Kathy Mcdonnell sent at 3/20/2023  1:02 PM CDT -----  Contact: [phone]  Type: Needs Medical Advice  Who Called:  Pt     Best Call Back Number: [phone]    Additional Information: Pt requesting to have PT orders put in before his appt on March 29. Pt states he over did it and hurt his shoulder a little bit at work.

## 2023-03-21 ENCOUNTER — TELEPHONE (OUTPATIENT)
Dept: ORTHOPEDICS | Facility: CLINIC | Age: 68
End: 2023-03-21
Payer: COMMERCIAL

## 2023-03-21 NOTE — TELEPHONE ENCOUNTER
I contacted the patient in regards to his message from yesterday about PT. I stated Dr. Campo wants to evaluate him next week at his follow up before any referrals are made. The patient stated understanding.

## 2023-03-23 ENCOUNTER — PATIENT MESSAGE (OUTPATIENT)
Dept: ADMINISTRATIVE | Facility: HOSPITAL | Age: 68
End: 2023-03-23
Payer: COMMERCIAL

## 2023-03-29 ENCOUNTER — OFFICE VISIT (OUTPATIENT)
Dept: ORTHOPEDICS | Facility: CLINIC | Age: 68
End: 2023-03-29
Payer: COMMERCIAL

## 2023-03-29 VITALS — WEIGHT: 173.94 LBS | BODY MASS INDEX: 27.95 KG/M2 | RESPIRATION RATE: 16 BRPM | HEIGHT: 66 IN

## 2023-03-29 DIAGNOSIS — S43.431D TEAR OF RIGHT GLENOID LABRUM, SUBSEQUENT ENCOUNTER: ICD-10-CM

## 2023-03-29 DIAGNOSIS — M25.511 ACUTE PAIN OF RIGHT SHOULDER: ICD-10-CM

## 2023-03-29 DIAGNOSIS — S46.011D TRAUMATIC INCOMPLETE TEAR OF RIGHT ROTATOR CUFF, SUBSEQUENT ENCOUNTER: Primary | ICD-10-CM

## 2023-03-29 PROCEDURE — 99999 PR PBB SHADOW E&M-EST. PATIENT-LVL III: ICD-10-PCS | Mod: PBBFAC,,, | Performed by: ORTHOPAEDIC SURGERY

## 2023-03-29 PROCEDURE — 1125F PR PAIN SEVERITY QUANTIFIED, PAIN PRESENT: ICD-10-PCS | Mod: S$GLB,,, | Performed by: ORTHOPAEDIC SURGERY

## 2023-03-29 PROCEDURE — 3008F PR BODY MASS INDEX (BMI) DOCUMENTED: ICD-10-PCS | Mod: S$GLB,,, | Performed by: ORTHOPAEDIC SURGERY

## 2023-03-29 PROCEDURE — 3288F FALL RISK ASSESSMENT DOCD: CPT | Mod: S$GLB,,, | Performed by: ORTHOPAEDIC SURGERY

## 2023-03-29 PROCEDURE — 3008F BODY MASS INDEX DOCD: CPT | Mod: S$GLB,,, | Performed by: ORTHOPAEDIC SURGERY

## 2023-03-29 PROCEDURE — 1101F PR PT FALLS ASSESS DOC 0-1 FALLS W/OUT INJ PAST YR: ICD-10-PCS | Mod: S$GLB,,, | Performed by: ORTHOPAEDIC SURGERY

## 2023-03-29 PROCEDURE — 99213 PR OFFICE/OUTPT VISIT, EST, LEVL III, 20-29 MIN: ICD-10-PCS | Mod: S$GLB,,, | Performed by: ORTHOPAEDIC SURGERY

## 2023-03-29 PROCEDURE — 1159F PR MEDICATION LIST DOCUMENTED IN MEDICAL RECORD: ICD-10-PCS | Mod: S$GLB,,, | Performed by: ORTHOPAEDIC SURGERY

## 2023-03-29 PROCEDURE — 1159F MED LIST DOCD IN RCRD: CPT | Mod: S$GLB,,, | Performed by: ORTHOPAEDIC SURGERY

## 2023-03-29 PROCEDURE — 99213 OFFICE O/P EST LOW 20 MIN: CPT | Mod: S$GLB,,, | Performed by: ORTHOPAEDIC SURGERY

## 2023-03-29 PROCEDURE — 3288F PR FALLS RISK ASSESSMENT DOCUMENTED: ICD-10-PCS | Mod: S$GLB,,, | Performed by: ORTHOPAEDIC SURGERY

## 2023-03-29 PROCEDURE — 1101F PT FALLS ASSESS-DOCD LE1/YR: CPT | Mod: S$GLB,,, | Performed by: ORTHOPAEDIC SURGERY

## 2023-03-29 PROCEDURE — 99999 PR PBB SHADOW E&M-EST. PATIENT-LVL III: CPT | Mod: PBBFAC,,, | Performed by: ORTHOPAEDIC SURGERY

## 2023-03-29 PROCEDURE — 1125F AMNT PAIN NOTED PAIN PRSNT: CPT | Mod: S$GLB,,, | Performed by: ORTHOPAEDIC SURGERY

## 2023-03-29 RX ORDER — ALPRAZOLAM 0.5 MG/1
0.5 TABLET ORAL 3 TIMES DAILY
Qty: 2 TABLET | Refills: 0 | Status: SHIPPED | OUTPATIENT
Start: 2023-03-29 | End: 2023-08-15

## 2023-03-29 NOTE — PROGRESS NOTES
Patient ID: Fernando Thomas is a 67 y.o. male.     Chief Complaint: Pain and Follow-up of the Right Shoulder        HPI:  Mr. Thomas returned today for re-evaluation of his right shoulder.  He was going to physical/occupational therapy and had great improvement of his right shoulder pain.  He stated he regained full motion and had relatively no pain until a proximally 2 weeks ago when he was hanging ceiling tiles for 4 hours.  When he awoke the next morning he had severe pain in his shoulder and decreased motion.  He has had pain since that time but it has slightly improved.  His right shoulder issues originally began on 12/21/2022 after he sustained a right shoulder dislocation after he fell 7 ft off of a ladder.  He was doing home exercises until this most recent exacerbation.  Now forward flexion, abduction and overhead activities increase his symptoms while nothing seems to improve them.  His symptoms are awakening him at night.  He is weak in his right upper extremity especially with forward flexion and abduction.  He can not do overhead activities..       ROS:  No new diagnosis/surgery/prescriptions since last office visit on 02/08/2023.  Constitutional: Negative for chills and fever.   HENT:  Negative for congestion.    Eyes:  Negative for blurred vision and double vision.   Cardiovascular:  Negative for chest pain and cyanosis.   Respiratory:  Negative for cough and shortness of breath.    Endocrine: Negative for cold intolerance and polydipsia.   Hematologic/Lymphatic: Negative for adenopathy.   Skin:  Negative for flushing, itching and skin cancer.   Musculoskeletal:  Positive for joint pain. Negative for gout.   Gastrointestinal:  Negative for constipation, diarrhea and heartburn.   Genitourinary:  Negative for nocturia.   Neurological:  Negative for headaches and seizures.   Psychiatric/Behavioral:  Negative for depression and substance abuse. The patient is not nervous/anxious.     Allergic/Immunologic: Negative for environmental allergies.       Objective:   Physical Exam:   General: AAOx3.  No acute distress  Vascular:  Pulses intact and equal bilaterally.  Capillary refill less than 3 seconds and equal bilaterally  Neurologic:  Pinprick and soft touch intact and equal bilaterally  Integment:  No ecchymosis, no errythema  Extremity:  Shoulder: Forward flexion/abduction right shoulder active 0/95 degrees, passive 0/150 degrees, left shoulder 0/170 degrees.  Internal rotation right shoulder L3, left shoulder T10.  Mildly positive drop-arm right upper extremity..  Mildly positive lift-off right shoulder.  External rotation both shoulders near equal 0/15 degrees.  Full can mildly positive right shoulder.  Empty can positive right shoulder.  Pedersen/Neer positive right shoulder.  Cross-arm negative both shoulders.  Nontender in the bicipital groove bilaterally.  Yergason's negative bilaterally.  Apprehension/relocation positive right shoulder.  Radiography:  No new x-rays done today.      Assessment:       Impression:      1. Rotator cuff tear, right shoulder.   2. Labrum tear, right shoulder.   3. AC arthritis, right shoulder   4.   5.  History of right shoulder dislocation    Right shoulder pain.          Plan:       1.  Discussed physical examination with the patient. Fernando understands that he appears to have a rotator cuff tear along with a labrum tear of his right shoulder.  Treatment alternatives and outcomes were discussed with the patient he understands he could continue to be treated conservatively with observation, activity modification, NSAIDs, bracing, physical therapy, injections, or he could consider surgical intervention such as arthroscopy.  Before any surgery can be recommended an MRI is warranted.  The patient stated he has anxiety issues with MRI.  Discussed with the patient may try to get him into an open MRI for his evaluation.    2. Refer for an open MRI of the right  shoulder.  3. Final recommendations after MRI is completed.  4. Since the patient does have anxiety will prescribe Xanax 0.5 mg, 1 p.o. just before MRI dispense 2, refill 0 a prescription was forwarded to the patient's pharmacy by [facility].    5. Continue doing home exercises as previously shown discussed.  6. Any pain can be treated with over-the-counter medications dosed per box instructions.    7. Recommend he purchase over-the-counter Voltaren gel and applied to his shoulder twice daily and massage in for 2 minutes.    8. May use right upper extremity as tolerated but would not recommend any heavy lifting or overhead activities.  No work on ladders/scaffolding/stairs.  9. Follow up after MRI.

## 2023-04-06 ENCOUNTER — TELEPHONE (OUTPATIENT)
Dept: ORTHOPEDICS | Facility: CLINIC | Age: 68
End: 2023-04-06
Payer: COMMERCIAL

## 2023-04-06 DIAGNOSIS — M25.511 ACUTE PAIN OF RIGHT SHOULDER: ICD-10-CM

## 2023-04-06 DIAGNOSIS — S43.431D TEAR OF RIGHT GLENOID LABRUM, SUBSEQUENT ENCOUNTER: ICD-10-CM

## 2023-04-06 DIAGNOSIS — S46.011D TRAUMATIC INCOMPLETE TEAR OF RIGHT ROTATOR CUFF, SUBSEQUENT ENCOUNTER: Primary | ICD-10-CM

## 2023-04-06 NOTE — TELEPHONE ENCOUNTER
I contacted the patient in regards to the open MRI arthrogram. The patient stated that his right shoulder was feeling better and he felt he did not need to proceed with the arthrogram. He stated he would like to try therapy. I told him I would send a message to Dr. Campo and call him back when I get a response. The patient stated understanding.

## 2023-04-06 NOTE — TELEPHONE ENCOUNTER
I attempted to contact the patient. I left a voice mail stating therapy had been ordered and the [facility] therapy clinic should be reaching out to schedule his therapy. I stated for him to call with any questions or concerns and when he was ready to have his arthrogram order sent to an open mri facility.

## 2023-04-11 ENCOUNTER — TELEPHONE (OUTPATIENT)
Dept: ORTHOPEDICS | Facility: CLINIC | Age: 68
End: 2023-04-11
Payer: COMMERCIAL

## 2023-04-11 ENCOUNTER — PATIENT MESSAGE (OUTPATIENT)
Dept: ADMINISTRATIVE | Facility: HOSPITAL | Age: 68
End: 2023-04-11
Payer: COMMERCIAL

## 2023-04-11 NOTE — TELEPHONE ENCOUNTER
Returned call. LVM with therapy's phone number for him to call and be scheduled for his therapy.     ----- Message from Azucena Jacome sent at 4/11/2023 11:34 AM CDT -----  Type:  Patient Returning Call         Who Called:SERGIO CARROLL [[id number]]         Who Left Message for Patient: Alessia Gonzáles LPN          Does the patient know what this is regarding? Order for PT has been approved, please assist with this matter , stated that you all were waiting for approval from [employer]          Best Call Back Number: [phone]         Additional Information:

## 2023-05-01 ENCOUNTER — CLINICAL SUPPORT (OUTPATIENT)
Dept: REHABILITATION | Facility: HOSPITAL | Age: 68
End: 2023-05-01
Attending: ORTHOPAEDIC SURGERY
Payer: COMMERCIAL

## 2023-05-01 DIAGNOSIS — M25.511 ACUTE PAIN OF RIGHT SHOULDER: ICD-10-CM

## 2023-05-01 DIAGNOSIS — S43.431D TEAR OF RIGHT GLENOID LABRUM, SUBSEQUENT ENCOUNTER: ICD-10-CM

## 2023-05-01 DIAGNOSIS — S46.011D TRAUMATIC INCOMPLETE TEAR OF RIGHT ROTATOR CUFF, SUBSEQUENT ENCOUNTER: ICD-10-CM

## 2023-05-01 PROBLEM — S46.011A TRAUMATIC INCOMPLETE TEAR OF RIGHT ROTATOR CUFF: Status: ACTIVE | Noted: 2023-05-01

## 2023-05-01 PROCEDURE — 97110 THERAPEUTIC EXERCISES: CPT

## 2023-05-01 PROCEDURE — 97166 OT EVAL MOD COMPLEX 45 MIN: CPT

## 2023-05-01 NOTE — PLAN OF CARE
[facility] Occupational Therapy  Initial Evaluation   Date: 5/1/2023  Name: Fernando Thomas  Clinic Number: [id number]    Therapy Diagnosis:   Encounter Diagnoses   Name Primary?    Traumatic incomplete tear of right rotator cuff, subsequent encounter     Tear of right glenoid labrum, subsequent encounter     Acute pain of right shoulder      Physician: John Campo DO    Physician Orders: evaluate and treat  Medical Diagnosis: right shoulder pain   Surgical Procedure and Date: none  Evaluation Date: 5/1/2023  Insurance Authorization Period Expiration: 5/1/2023 to 12/31/2023  Plan of Care Certification Period: 5/1/2023 to 7/1/2023   Date of Return to MD: n/a at this time  Visit # / Visits authorized: 1 / 1      Precautions:  Standard    Time In:2:30  Time Out: 3:30  Total Appointment Time (timed & untimed codes): 30 minutes      Subjective     Involved Side: right  Dominant Side: left  Date of Onset: 121/2022  History of Current Condition/Mechanism of Injury: patient w/ previous hx of shoulder dislocation and pain in shoulder for approx 1 year    Imaging: see epic  Previous Therapy: patient did received PT for this shoulder w/ some relief, yet re-injured shoulder by doing overhead work.       Past Medical History/Physical Systems Review:   Fernando Thomas  has a past medical history of Mixed hyperlipidemia.    Fernando Thomas  has a past surgical history that includes Carpal tunnel release (Bilateral); Hernia repair; and Knee Arthroplasty (Right).    Fernando has a current medication list which includes the following prescription(s): alprazolam, atorvastatin, diazepam, gabapentin, hydrocodone-acetaminophen, fish oil-omega-3 fatty acids, and vitamin d.    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     Current Functional Status   Home/Living environment : lives with his wife    Limitation of Functional Status as follows: decreased motion, use, and strength.    ADLs/IADLs: ind w/ all adls and iadls.                   pain meds:prn ibuprofen        Objective   Posture:guarded at right shoulder      Sensation:intact  ROM:   Er 45, 90 abd and sidelying IR done on elevated treatment/therapy mat      right passive range of motion external rotation at 0 abduction 25deg   at 45 abduction 25deg    at 90 abduction 20deg;       sidelying internal rotation  8 inches from mat        internal rotation behind back back pocket - zero  lift off  left passive range of motion external rotation at 0 abduction 55 deg    at 45 abduction 55deg    at  90 abduction 70deg;      sidelying internal rotation 2inch from mat         internal rotation behind back lb 8 inch lift off                   CMS Impairment/Limitation/Restriction for FOTO Survey    Therapist reviewed FOTO scores for Fernando Thomas on 5/1/2023.   FOTO documents entered into [employer] - see Media section.    Limitation Score: TBD%               Treatment         Fernando received therapeutic exercises for 30 minutes including:  -education on HEP for PENDULUMS AND AAROM FOR RIGHT UE FROM SUPINE        Home Exercise Program/Education:  Issued HEP (see patient instructions in EMR) . Exercises were reviewed and Fernando was able to demonstrate them prior to the end of the session.   Pt received a written copy of exercises to perform at home. Fernando demonstrated good  understanding of the education provided.  Pt was advised to perform these exercises free of pain, and to stop performing them if pain occurs.    Patient/Family Education: role of OT, goals for OT, scheduling/cancellations - pt verbalized understanding. Discussed insurance limitations with patient.    Additional Education provided: likely tx progression, expectations of rehab    Assessment     Fernando Thomas is a 67 y.o. male referred to outpatient occupational therapy and presents with a medical diagnosis of see above resulting in Decreased ROM, Decreased functional hand use, Increased pain, and Joint Stiffness and demonstrates limitations as described in the chart below. Following medical record review it is determined that pt will benefit from occupational therapy services in order to maximize pain free and/or functional use of HIS RIGHT ARM . The following goals were discussed with the patient and patient is in agreement with them as to be addressed in the treatment plan. The patient's rehab potential is GOOD.     Anticipated barriers to occupational therapy: edema, pain, stiffness, weakness, scarring, time from day of injury and/or day of surgery to first day of OT.    Pt has no cultural, educational or language barriers to learning provided.    Profile and History Assessment of Occupational Performance Level of Clinical Decision Making Complexity Score   Occupational Profile:   Fernando Thomas is a 67 y.o. male who lives with their spouse and is currently employed Fernando Thomas has difficulty with  ADLs and IADLs as listed previously, which  affecting his/her daily functional abilities.      Comorbidities:    has a past medical history of Mixed hyperlipidemia.    Medical and Therapy History Review:   Expanded               Performance Deficits    Physical:  Joint Mobility  Joint Stability  Muscle Power/Strength  Muscle Endurance  Gross Motor Coordination  Pain    Cognitive:  No  Deficits    Psychosocial:    No Deficits     Clinical Decision Making:  moderate    Assessment Process:  Detailed Assessments    Modification/Need for Assistance:  Not Necessary    Intervention Selection:  Several Treatment Options       moderate  Based on PMHX, co morbidities , data from assessments and functional level of assistance required with task and clinical presentation directly impacting function.           The following goals were discussed with the patient and patient is in agreement with them as to be addressed in the treatment plan.     Goals:   stg to be met in 3 weeks   1. Patient will be I with HEP   2. Patient will have 2/10 pain with light use   3. Patient will have = prom of bilateral SHOULDERS  to enhance affected arm use with ADL      ltg to be met by d/c   1. Patient will be I with d/c HEP   2. Patient will have 0/10 pain with all use   all goals ongoing unless noted above or met today or in past but not noted yet    Plan   Certification Period/Plan of care expiration: 5/1/2023 to 7/1/2023    Outpatient Occupational Therapy 2 times weekly for 8 weeks to include the following interventions: eval and tx and could include :  Therex, theract, adl training, neuromuscular gregorio activities; supervised modalities, Manual therapy/joint mobilizations,therapeutic massage techniques.     Above frequency and duration in above dates may change based on patient progress and need for therapy    Zoë YA

## 2023-05-08 ENCOUNTER — CLINICAL SUPPORT (OUTPATIENT)
Dept: REHABILITATION | Facility: HOSPITAL | Age: 68
End: 2023-05-08
Payer: COMMERCIAL

## 2023-05-08 DIAGNOSIS — M25.511 ACUTE PAIN OF RIGHT SHOULDER: Primary | ICD-10-CM

## 2023-05-08 PROCEDURE — 97110 THERAPEUTIC EXERCISES: CPT

## 2023-05-08 PROCEDURE — 97530 THERAPEUTIC ACTIVITIES: CPT

## 2023-05-08 NOTE — PROGRESS NOTES
"  Occupational Therapy Daily Treatment Note     Date: 5/8/2023  Name: Fernando Thomas  Clinic Number: [id number]    Therapy Diagnosis: SHOULDER PAIN  Physician: John Campo DO     Physician Orders: evaluate and treat  Medical Diagnosis: right shoulder pain   Surgical Procedure and Date: none  Evaluation Date: 5/1/2023  Insurance Authorization Period Expiration: 5/1/2023 to 12/31/2023  Plan of Care Certification Period: 5/1/2023 to 7/1/2023   Date of Return to MD: n/a at this time  Visit # / Visits authorized: 2 / 12     Time In:2:30  Time Out: 3:30  Total Billable Time: 60 minutes    Precautions:  universal, see epic, protocol if applicable    Subjective     Pt reports: "I did my homework"  he was compliant with home exercise program.  Response to previous treatment:did well w/ PT then had a setback.   Functional change: decreased ind w/ overhead use    Pain: 0/10 rest; 2/10 light use  Side:right  Location: anterior shoulder  Objective   Fernando received therapeutic exercises to develop strength, endurance, ROM, and flexibility for 45 minutes including:  ER STRETCHES IN SUPINE @ 45 DEGREES OF ABD FOR RIGHT SHOULDER FOLLOWED BY ER STRETCHES @0 DEGREES ABD IN STAND . 4 MIN X 2 TRIALS EACH.   UBE 8 MIN X  1 TRIAL . Isometric strengthening for shoulder flexion, ext, er and ir.        Fernando participated in dynamic functional therapeutic activities to improve functional performance for 15  minutes, including:  EDUCATION ON HEP FOR ER STRETCHES.             Home Exercises and Education Provided     Education provided:   STRETCHING EXERCISES FOR ER AT 45 AND 00 DEGREES OF ABD.       progress towards goals   likely tx progression  rationale of rehab interventions    Written Home Exercises/Information Provided: Patient instructed to cont prior HEP.        previously issued exercises and/or other issued home therapy instructions were reviewed if still part of current tx plan as well as any issued today and Fernando was " able to demonstrate them prior to the end of the session.  Fernando demonstrated Ind w/ understanding of the HEP provided.   See EMR under patient instructions and/or media for HEP issued today or in past    Assessment     Pt would continue to benefit from skilled OT in order to address ROM, PAIN, AND/OR DECREASED FUNCTIONAL STATUS SECONDARY TO CURRENT INJURY/DEBILITATING FACTOR.     Fernando is progressing well towards his goals and there are NO updates to goals at this time unless goals noted below are different than goals on previous notes or evaluation. Pt prognosis is GOOD  Pt will continue to benefit from skilled outpatient occupational therapy to address the deficits listed in the problem list on initial evaluation to provide pt/family education and to maximize pt's level of independence in the home and community environment.     Anticipated barriers to occupational therapy: NONE    Pt's spiritual, cultural and educational needs have been considered and pt is agreeable to plan of care and goals.        Goals:   stg to be met in 3 weeks   1. Patient will be I with HEP ongoing  2. Patient will have 2/10 pain with light use ongoing  3. Patient will have = prom of bilateral SHOULDERS  to enhance affected arm use with ADL ongoing        ltg to be met by d/c   1. Patient will be I with d/c HEP ongoing  2. Patient will have 0/10 pain with all use ongoing  all goals ongoing unless noted above or met today or in past but not noted yet ongoing          Any goals met today ?  (if any met see above)    Updates/Grading for next session: prn     Plan: evaluate and tx     Zoë FELTON Velazco

## 2023-05-11 ENCOUNTER — CLINICAL SUPPORT (OUTPATIENT)
Dept: REHABILITATION | Facility: HOSPITAL | Age: 68
End: 2023-05-11
Payer: COMMERCIAL

## 2023-05-11 DIAGNOSIS — M25.511 ACUTE PAIN OF RIGHT SHOULDER: Primary | ICD-10-CM

## 2023-05-11 PROCEDURE — 97110 THERAPEUTIC EXERCISES: CPT

## 2023-05-11 NOTE — PROGRESS NOTES
"  Occupational Therapy Daily Treatment Note     Date: 5/11/2023  Name: Fernando Thomas  Clinic Number: [id number]    Therapy Diagnosis: SHOULDER PAIN  Physician: John Campo DO     Physician Orders: evaluate and treat  Medical Diagnosis: right shoulder pain   Surgical Procedure and Date: none  Evaluation Date: 5/1/2023  Insurance Authorization Period Expiration: 5/1/2023 to 12/31/2023  Plan of Care Certification Period: 5/1/2023 to 7/1/2023   Date of Return to MD: n/a at this time  Visit # / Visits authorized: 3 /12     Time In:3:30  Time Out: 4:30  Total Billable Time: 60 minutes    Precautions:  universal, see epic, protocol if applicable    Subjective     Pt reports: "I am doing my homework"  he was compliant with home exercise program.  Response to previous treatment:did well w/ PT then had a setback.   Functional change: decreased ind w/ overhead use    Pain: 0/10 rest; 2/10 light use  Side:right  Location: anterior shoulder  Objective   Fernando received therapeutic exercises to develop strength, endurance, ROM, and flexibility for 60 minutes including:  ER STRETCHES IN SUPINE @ 45 DEGREES OF ABD FOR RIGHT SHOULDER FOLLOWED BY ER STRETCHES @0 DEGREES ABD IN STAND . 4 MIN X 2 TRIALS EACH.   UBE 8 MIN X  1 TRIAL . Isometric strengthening for shoulder flexion, ext, er and ir.    Patient performed wand exercises from supine for shoulder flexion.  Patient performed scapular squeeze exercises,  IR strengthening exercises w/ use of yellow theraband followed by isometric ER and isometric ABD shoulder exercises.      Ube x  10min            Home Exercises and Education Provided     Education provided:   STRETCHING EXERCISES FOR ER AT 45 AND 00 DEGREES OF ABD.       progress towards goals   likely tx progression  rationale of rehab interventions    Written Home Exercises/Information Provided: Patient instructed to cont prior HEP.        previously issued exercises and/or other issued home therapy instructions were " reviewed if still part of current tx plan as well as any issued today and Fernando was able to demonstrate them prior to the end of the session.  Fernando demonstrated Ind w/ understanding of the HEP provided.   See EMR under patient instructions and/or media for HEP issued today or in past    Assessment     Pt would continue to benefit from skilled OT in order to address ROM, PAIN, AND/OR DECREASED FUNCTIONAL STATUS SECONDARY TO CURRENT INJURY/DEBILITATING FACTOR.     Fernando is progressing well towards his goals and there are NO updates to goals at this time unless goals noted below are different than goals on previous notes or evaluation. Pt prognosis is GOOD  Pt will continue to benefit from skilled outpatient occupational therapy to address the deficits listed in the problem list on initial evaluation to provide pt/family education and to maximize pt's level of independence in the home and community environment.     Anticipated barriers to occupational therapy: NONE    Pt's spiritual, cultural and educational needs have been considered and pt is agreeable to plan of care and goals.        Goals:   stg to be met in 3 weeks   1. Patient will be I with HEP ongoing  2. Patient will have 2/10 pain with light use ongoing  3. Patient will have = prom of bilateral SHOULDERS  to enhance affected arm use with ADL ongoing        ltg to be met by d/c   1. Patient will be I with d/c HEP ongoing  2. Patient will have 0/10 pain with all use ongoing  all goals ongoing unless noted above or met today or in past but not noted yet ongoing          Any goals met today ?  (if any met see above)    Updates/Grading for next session: prn     Plan: evaluate and tx     Zoë FELTON Velazco

## 2023-05-12 ENCOUNTER — PATIENT MESSAGE (OUTPATIENT)
Dept: FAMILY MEDICINE | Facility: CLINIC | Age: 68
End: 2023-05-12
Payer: COMMERCIAL

## 2023-05-15 ENCOUNTER — CLINICAL SUPPORT (OUTPATIENT)
Dept: REHABILITATION | Facility: HOSPITAL | Age: 68
End: 2023-05-15
Payer: COMMERCIAL

## 2023-05-15 DIAGNOSIS — M25.511 ACUTE PAIN OF RIGHT SHOULDER: Primary | ICD-10-CM

## 2023-05-15 PROCEDURE — 97530 THERAPEUTIC ACTIVITIES: CPT

## 2023-05-15 PROCEDURE — 97110 THERAPEUTIC EXERCISES: CPT

## 2023-05-15 NOTE — PROGRESS NOTES
"  Occupational Therapy Daily Treatment Note     Date: 5/15/2023  Name: Fernando Thomas  Clinic Number: [id number]    Therapy Diagnosis: SHOULDER PAIN  Physician: John Campo DO     Physician Orders: evaluate and treat  Medical Diagnosis: right shoulder pain   Surgical Procedure and Date: none  Evaluation Date: 5/1/2023  Insurance Authorization Period Expiration: 5/1/2023 to 12/31/2023  Plan of Care Certification Period: 5/1/2023 to 7/1/2023   Date of Return to MD: n/a at this time  Visit # / Visits authorized: 4/12     Time In:2:30  Time Out: 3:30  Total Billable Time: 60 minutes    Precautions:  universal, see epic, protocol if applicable    Subjective     Pt reports: "I am doing better."  he was compliant with home exercise program.  Response to previous treatment:did well w/ PT then had a setback.   Functional change: decreased ind w/ overhead use    Pain: 0/10 rest; 2/10 light use  Side:right  Location: anterior shoulder  Objective   Fernando received therapeutic exercises to develop strength, endurance, ROM, and flexibility for 60 minutes including:  ER STRETCHES IN SUPINE @ 45 DEGREES OF ABD FOR RIGHT SHOULDER FOLLOWED BY ER STRETCHES @0 DEGREES ABD IN STAND . 4 MIN X 2 TRIALS EACH.   UBE 8 MIN X  1 TRIAL . Isometric strengthening for shoulder flexion, ext, er and ir.    Patient performed wand exercises from supine for shoulder flexion.  Patient performed scapular squeeze exercises,  IR strengthening exercises w/ use of yellow theraband followed by isometric ER and isometric ABD shoulder exercises.      Ube x  10min      Home Exercises and Education Provided     Education provided:   STRETCHING EXERCISES FOR ER AT 45 AND 00 DEGREES OF ABD.       progress towards goals   likely tx progression  rationale of rehab interventions    Written Home Exercises/Information Provided: Patient instructed to cont prior HEP.      previously issued exercises and/or other issued home therapy instructions were reviewed if " still part of current tx plan as well as any issued today and Fernando was able to demonstrate them prior to the end of the session.  Fernando demonstrated Ind w/ understanding of the HEP provided.   See EMR under patient instructions and/or media for HEP issued today or in past    Assessment     Pt would continue to benefit from skilled OT in order to address ROM, PAIN, AND/OR DECREASED FUNCTIONAL STATUS SECONDARY TO CURRENT INJURY/DEBILITATING FACTOR.     Fernando is progressing well towards his goals and there are NO updates to goals at this time unless goals noted below are different than goals on previous notes or evaluation. Pt prognosis is GOOD  Pt will continue to benefit from skilled outpatient occupational therapy to address the deficits listed in the problem list on initial evaluation to provide pt/family education and to maximize pt's level of independence in the home and community environment.     Anticipated barriers to occupational therapy: NONE    Pt's spiritual, cultural and educational needs have been considered and pt is agreeable to plan of care and goals.        Goals:   stg to be met in 3 weeks   1. Patient will be I with HEP ongoing  2. Patient will have 2/10 pain with light use ongoing  3. Patient will have = prom of bilateral SHOULDERS  to enhance affected arm use with ADL ongoing        ltg to be met by d/c   1. Patient will be I with d/c HEP ongoing  2. Patient will have 0/10 pain with all use ongoing  all goals ongoing unless noted above or met today or in past but not noted yet ongoing          Any goals met today ?  (if any met see above)    Updates/Grading for next session: prn     Plan: evaluate and tx     Zoë FELTON Velazco

## 2023-05-18 ENCOUNTER — CLINICAL SUPPORT (OUTPATIENT)
Dept: REHABILITATION | Facility: HOSPITAL | Age: 68
End: 2023-05-18
Payer: COMMERCIAL

## 2023-05-18 DIAGNOSIS — M25.511 ACUTE PAIN OF RIGHT SHOULDER: Primary | ICD-10-CM

## 2023-05-18 PROCEDURE — 97110 THERAPEUTIC EXERCISES: CPT

## 2023-05-18 PROCEDURE — 97530 THERAPEUTIC ACTIVITIES: CPT

## 2023-05-23 ENCOUNTER — CLINICAL SUPPORT (OUTPATIENT)
Dept: REHABILITATION | Facility: HOSPITAL | Age: 68
End: 2023-05-23
Payer: COMMERCIAL

## 2023-05-23 DIAGNOSIS — M25.511 ACUTE PAIN OF RIGHT SHOULDER: Primary | ICD-10-CM

## 2023-05-23 PROCEDURE — 97530 THERAPEUTIC ACTIVITIES: CPT

## 2023-05-23 PROCEDURE — 97110 THERAPEUTIC EXERCISES: CPT

## 2023-05-23 NOTE — PROGRESS NOTES
"  Occupational Therapy Daily Treatment Note     Date: 5/23/2023  Name: Fernando Thomas  Clinic Number: [id number]    Therapy Diagnosis: SHOULDER PAIN  Physician: John Campo DO     Physician Orders: evaluate and treat  Medical Diagnosis: right shoulder pain   Surgical Procedure and Date: none  Evaluation Date: 5/1/2023  Insurance Authorization Period Expiration: 5/1/2023 to 12/31/2023  Plan of Care Certification Period: 5/1/2023 to 7/1/2023   Date of Return to MD: n/a at this time  Visit # / Visits authorized: 5/12     Time In:3:30  Time Out: 4:30  Total Billable Time: 60 minutes    Precautions:  universal, see epic, protocol if applicable    Subjective     Pt reports: "I am reaching so much higher."  he was compliant with home exercise program.  Response to previous treatment:did well w/ PT then had a setback.   Functional change: decreased ind w/ overhead use    Pain: 0/10 rest; 0/10 light use  Side:right  Location: anterior shoulder  Objective   Fernando received therapeutic exercises to develop strength, endurance, ROM, and flexibility for 60 minutes including:  ER STRETCHES IN SUPINE @ 45 DEGREES OF ABD FOR RIGHT SHOULDER FOLLOWED BY ER STRETCHES @0 DEGREES ABD IN STAND . 4 MIN X 2 TRIALS EACH.   UBE 8 MIN X  1 TRIAL . Isometric strengthening for shoulder flexion, ext, er and ir.    Patient performed wand exercises from supine for shoulder flexion.  Patient performed scapular squeeze exercises,  IR strengthening exercises w/ use of yellow theraband followed by isometric ER and isometric ABD shoulder exercises.      Ube x  10min      Home Exercises and Education Provided     Education provided:   STRETCHING EXERCISES FOR ER AT 45 AND 00 DEGREES OF ABD.       progress towards goals   likely tx progression  rationale of rehab interventions    Written Home Exercises/Information Provided: Patient instructed to cont prior HEP.      previously issued exercises and/or other issued home therapy instructions were " reviewed if still part of current tx plan as well as any issued today and Fernando was able to demonstrate them prior to the end of the session.  Fernando demonstrated Ind w/ understanding of the HEP provided.   See EMR under patient instructions and/or media for HEP issued today or in past    Assessment   Patient continues to demonstrate increased arom, yet is now stating he is having some apprehension for some exercises.   Pt would continue to benefit from skilled OT in order to address ROM, PAIN, AND/OR DECREASED FUNCTIONAL STATUS SECONDARY TO CURRENT INJURY/DEBILITATING FACTOR.     Fernando is progressing well towards his goals and there are NO updates to goals at this time unless goals noted below are different than goals on previous notes or evaluation. Pt prognosis is GOOD  Pt will continue to benefit from skilled outpatient occupational therapy to address the deficits listed in the problem list on initial evaluation to provide pt/family education and to maximize pt's level of independence in the home and community environment.     Anticipated barriers to occupational therapy: NONE    Pt's spiritual, cultural and educational needs have been considered and pt is agreeable to plan of care and goals.        Goals:   stg to be met in 3 weeks   1. Patient will be I with HEP ongoing  2. Patient will have 2/10 pain with light use ongoing  3. Patient will have = prom of bilateral SHOULDERS  to enhance affected arm use with ADL ongoing        ltg to be met by d/c   1. Patient will be I with d/c HEP ongoing  2. Patient will have 0/10 pain with all use ongoing  all goals ongoing unless noted above or met today or in past but not noted yet ongoing          Any goals met today ?  (if any met see above)    Updates/Grading for next session: prn     Plan: evaluate and tx     Zoë FELTON Velazco

## 2023-05-25 ENCOUNTER — CLINICAL SUPPORT (OUTPATIENT)
Dept: REHABILITATION | Facility: HOSPITAL | Age: 68
End: 2023-05-25
Payer: COMMERCIAL

## 2023-05-25 DIAGNOSIS — M25.511 ACUTE PAIN OF RIGHT SHOULDER: Primary | ICD-10-CM

## 2023-05-25 PROCEDURE — 97110 THERAPEUTIC EXERCISES: CPT

## 2023-05-25 PROCEDURE — 97530 THERAPEUTIC ACTIVITIES: CPT

## 2023-05-29 NOTE — PROGRESS NOTES
"  Occupational Therapy Daily Treatment Note     Date: 5/25/2023  Name: Fernando Thomas  Clinic Number: [id number]    Therapy Diagnosis: SHOULDER PAIN  Physician: John Campo DO     Physician Orders: evaluate and treat  Medical Diagnosis: right shoulder pain   Surgical Procedure and Date: none  Evaluation Date: 5/1/2023  Insurance Authorization Period Expiration: 5/1/2023 to 12/31/2023  Plan of Care Certification Period: 5/1/2023 to 7/1/2023   Date of Return to MD: n/a at this time  Visit # / Visits authorized: 6/12     Time In:3:30  Time Out: 4:30  Total Billable Time: 60 minutes    Precautions:  universal, see epic, protocol if applicable    Subjective     Pt reports: "I feel better."  he was compliant with home exercise program.  Response to previous treatment:did well w/ PT then had a setback.   Functional change: decreased ind w/ overhead use    Pain: 0/10 rest; 0/10 light use  Side:right  Location: anterior shoulder  Objective   Fernando received therapeutic exercises to develop strength, endurance, ROM, and flexibility for 60 minutes including:  ER STRETCHES IN SUPINE @ 45 DEGREES OF ABD FOR RIGHT SHOULDER FOLLOWED BY ER STRETCHES @0 DEGREES ABD IN STAND . 4 MIN X 2 TRIALS EACH.   UBE 8 MIN X  1 TRIAL . Isometric strengthening for shoulder flexion, ext, er and ir.    Patient performed wand exercises from supine for shoulder flexion.  Patient performed scapular squeeze exercises,  IR strengthening exercises w/ use of yellow theraband followed by isometric ER and isometric ABD shoulder exercises.      Ube x  10min      Home Exercises and Education Provided     Education provided:   STRETCHING EXERCISES FOR ER AT 45 AND 00 DEGREES OF ABD.       progress towards goals   likely tx progression  rationale of rehab interventions    Written Home Exercises/Information Provided: Patient instructed to cont prior HEP.      previously issued exercises and/or other issued home therapy instructions were reviewed if " still part of current tx plan as well as any issued today and Fernando was able to demonstrate them prior to the end of the session.  Fernando demonstrated Ind w/ understanding of the HEP provided.   See EMR under patient instructions and/or media for HEP issued today or in past    Assessment   Patient continues to demonstrate increased arom, and is now getting better w/ less use of compensatory posturing. Pt would continue to benefit from skilled OT in order to address ROM, PAIN, AND/OR DECREASED FUNCTIONAL STATUS SECONDARY TO CURRENT INJURY/DEBILITATING FACTOR.     Fernando is progressing well towards his goals and there are NO updates to goals at this time unless goals noted below are different than goals on previous notes or evaluation. Pt prognosis is GOOD  Pt will continue to benefit from skilled outpatient occupational therapy to address the deficits listed in the problem list on initial evaluation to provide pt/family education and to maximize pt's level of independence in the home and community environment.     Anticipated barriers to occupational therapy: NONE    Pt's spiritual, cultural and educational needs have been considered and pt is agreeable to plan of care and goals.        Goals:   stg to be met in 3 weeks   1. Patient will be I with HEP ongoing  2. Patient will have 2/10 pain with light use ongoing  3. Patient will have = prom of bilateral SHOULDERS  to enhance affected arm use with ADL ongoing        ltg to be met by d/c   1. Patient will be I with d/c HEP ongoing  2. Patient will have 0/10 pain with all use ongoing  all goals ongoing unless noted above or met today or in past but not noted yet ongoing          Any goals met today ?  (if any met see above)    Updates/Grading for next session: prn     Plan: evaluate and tx     Zoë FELTON Velazco

## 2023-06-01 ENCOUNTER — CLINICAL SUPPORT (OUTPATIENT)
Dept: REHABILITATION | Facility: HOSPITAL | Age: 68
End: 2023-06-01
Attending: ORTHOPAEDIC SURGERY
Payer: COMMERCIAL

## 2023-06-01 DIAGNOSIS — M25.511 ACUTE PAIN OF RIGHT SHOULDER: Primary | ICD-10-CM

## 2023-06-01 PROCEDURE — 97110 THERAPEUTIC EXERCISES: CPT

## 2023-06-01 PROCEDURE — 97530 THERAPEUTIC ACTIVITIES: CPT

## 2023-06-01 NOTE — PROGRESS NOTES
"  Occupational Therapy Daily Treatment Note     Date: 6/1/2023  Name: Fernando Thomas  Clinic Number: [id number]    Therapy Diagnosis: SHOULDER PAIN  Physician: John Campo DO     Physician Orders: evaluate and treat  Medical Diagnosis: right shoulder pain   Surgical Procedure and Date: none  Evaluation Date: 5/1/2023  Insurance Authorization Period Expiration: 5/1/2023 to 12/31/2023  Plan of Care Certification Period: 5/1/2023 to 7/1/2023   Date of Return to MD: n/a at this time  Visit # / Visits authorized: 7/12     Time In:3:30  Time Out: 4:30  Total Billable Time: 60 minutes    Precautions:  universal, see epic, protocol if applicable    Subjective     Pt reports: "I am sick"  he was compliant with home exercise program.  Response to previous treatment:did well w/ PT then had a setback.   Functional change: decreased ind w/ overhead use    Pain: 0/10 rest; 0/10 light use  Side:right  Location: anterior shoulder  Objective   Fernando received therapeutic exercises to develop strength, endurance, ROM, and flexibility for 60 minutes including:  ER STRETCHES IN SUPINE @ 45 DEGREES OF ABD FOR RIGHT SHOULDER FOLLOWED BY ER STRETCHES @0 DEGREES ABD IN STAND . 4 MIN X 2 TRIALS EACH.   UBE 8 MIN X  1 TRIAL . Isometric strengthening for shoulder flexion, ext, er and ir.    Patient performed wand exercises from supine for shoulder flexion.  Patient performed scapular squeeze exercises.  ER STRETCHES FROM 90 DEGREES ABD OF SHOULDER 3MIN HOLD X 2 TRIALS .     Home Exercises and Education Provided     Education provided:   STRETCHING EXERCISES FOR ER AT 45 AND 00 DEGREES OF ABD.       progress towards goals   likely tx progression  rationale of rehab interventions    Written Home Exercises/Information Provided: Patient instructed to cont prior HEP.      previously issued exercises and/or other issued home therapy instructions were reviewed if still part of current tx plan as well as any issued today and Fernando was able " to demonstrate them prior to the end of the session.  Fernando demonstrated Ind w/ understanding of the HEP provided.   See EMR under patient instructions and/or media for HEP issued today or in past    Assessment   Patient continues to demonstrate increased arom, and is now getting better w/ less use of compensatory posturing. Pt would continue to benefit from skilled OT in order to address ROM, PAIN, AND/OR DECREASED FUNCTIONAL STATUS SECONDARY TO CURRENT INJURY/DEBILITATING FACTOR.     Fernando is progressing well towards his goals and there are NO updates to goals at this time unless goals noted below are different than goals on previous notes or evaluation. Pt prognosis is GOOD  Pt will continue to benefit from skilled outpatient occupational therapy to address the deficits listed in the problem list on initial evaluation to provide pt/family education and to maximize pt's level of independence in the home and community environment.     Anticipated barriers to occupational therapy: NONE    Pt's spiritual, cultural and educational needs have been considered and pt is agreeable to plan of care and goals.        Goals:   stg to be met in 3 weeks   1. Patient will be I with HEP ongoing  2. Patient will have 2/10 pain with light use ongoing  3. Patient will have = prom of bilateral SHOULDERS  to enhance affected arm use with ADL ongoing        ltg to be met by d/c   1. Patient will be I with d/c HEP ongoing  2. Patient will have 0/10 pain with all use ongoing  all goals ongoing unless noted above or met today or in past but not noted yet ongoing          Any goals met today ?  (if any met see above)    Updates/Grading for next session: prn     Plan: evaluate and tx     Zoë FELTON Velazco

## 2023-06-05 ENCOUNTER — OFFICE VISIT (OUTPATIENT)
Dept: URGENT CARE | Facility: CLINIC | Age: 68
End: 2023-06-05
Payer: COMMERCIAL

## 2023-06-05 VITALS
WEIGHT: 175 LBS | SYSTOLIC BLOOD PRESSURE: 124 MMHG | BODY MASS INDEX: 28.12 KG/M2 | HEART RATE: 74 BPM | TEMPERATURE: 98 F | DIASTOLIC BLOOD PRESSURE: 76 MMHG | HEIGHT: 66 IN | OXYGEN SATURATION: 98 %

## 2023-06-05 DIAGNOSIS — J40 BRONCHITIS: Primary | ICD-10-CM

## 2023-06-05 DIAGNOSIS — R05.9 COUGH, UNSPECIFIED TYPE: ICD-10-CM

## 2023-06-05 LAB
CTP QC/QA: YES
SARS-COV-2 AG RESP QL IA.RAPID: NEGATIVE

## 2023-06-05 PROCEDURE — 87811 SARS-COV-2 COVID19 W/OPTIC: CPT | Mod: QW,,, | Performed by: NURSE PRACTITIONER

## 2023-06-05 PROCEDURE — 99202 PR OFFICE/OUTPT VISIT, NEW, LEVL II, 15-29 MIN: ICD-10-PCS | Mod: ,,, | Performed by: NURSE PRACTITIONER

## 2023-06-05 PROCEDURE — 87811 SARS CORONAVIRUS 2 ANTIGEN POCT, MANUAL READ: ICD-10-PCS | Mod: QW,,, | Performed by: NURSE PRACTITIONER

## 2023-06-05 PROCEDURE — 99202 OFFICE O/P NEW SF 15 MIN: CPT | Mod: ,,, | Performed by: NURSE PRACTITIONER

## 2023-06-05 RX ORDER — DOXYCYCLINE 100 MG/1
100 CAPSULE ORAL EVERY 12 HOURS
Qty: 14 CAPSULE | Refills: 0 | Status: SHIPPED | OUTPATIENT
Start: 2023-06-05 | End: 2023-06-12

## 2023-06-05 RX ORDER — PROMETHAZINE HYDROCHLORIDE AND DEXTROMETHORPHAN HYDROBROMIDE 6.25; 15 MG/5ML; MG/5ML
5 SYRUP ORAL EVERY 4 HOURS PRN
Qty: 118 ML | Refills: 0 | Status: SHIPPED | OUTPATIENT
Start: 2023-06-05 | End: 2023-06-15

## 2023-06-05 NOTE — PROGRESS NOTES
"Subjective:       Patient ID: Fernando Thomas is a 67 y.o. male.    Vitals:  height is 5' 6" (1.676 m) and weight is 79.4 kg (175 lb). His oral temperature is 98.2 °F (36.8 °C). His blood pressure is 124/76 and his pulse is 74. His oxygen saturation is 98%.     Chief Complaint: Cough (Cough x 4 days.)    This is a 67 y.o. male who presents today with a chief complaint of  cough x 4 days.  Patient is requesting a COVID test.   Patient presents with:  Cough: Cough x 4 days.        Cough  This is a new problem. The current episode started in the past 7 days. The problem has been gradually worsening. The problem occurs every few minutes. The cough is Productive of sputum. Associated symptoms include nasal congestion and a sore throat. The symptoms are aggravated by lying down. He has tried OTC cough suppressant (mucinex, dayquil,) for the symptoms. The treatment provided mild relief.     HENT:  Positive for sore throat.    Respiratory:  Positive for cough.          Objective:      Physical Exam   Constitutional: He is oriented to person, place, and time. normal  HENT:   Head: Normocephalic and atraumatic.   Ears:   Right Ear: Tympanic membrane, external ear and ear canal normal.   Left Ear: Tympanic membrane, external ear and ear canal normal.   Nose: Nose normal.   Mouth/Throat: Mucous membranes are moist. Oropharynx is clear.   Eyes: Conjunctivae are normal. Extraocular movement intact   Neck: Neck supple.   Cardiovascular: Normal rate, regular rhythm, normal heart sounds and normal pulses.   Pulmonary/Chest: Effort normal and breath sounds normal.   Abdominal: Normal appearance.   Musculoskeletal: Normal range of motion.         General: Normal range of motion.   Neurological: He is alert and oriented to person, place, and time.   Skin: Skin is warm and dry.   Psychiatric: His behavior is normal. Mood normal.   Vitals reviewed.      Past medical history and current medications reviewed.       Assessment:         "   1. Bronchitis    2. Cough, unspecified type              Plan:         Bronchitis  -     promethazine-dextromethorphan (PROMETHAZINE-DM) 6.25-15 mg/5 mL Syrp; Take 5 mLs by mouth every 4 (four) hours as needed (cough).  Dispense: 118 mL; Refill: 0  -     doxycycline (VIBRAMYCIN) 100 MG Cap; Take 1 capsule (100 mg total) by mouth every 12 (twelve) hours. for 7 days  Dispense: 14 capsule; Refill: 0    Cough, unspecified type  -     SARS Coronavirus 2 Antigen, POCT Manual Read             There are no Patient Instructions on file for this visit.           Medical Decision Making:   Differential Diagnosis:   Covid

## 2023-06-08 ENCOUNTER — CLINICAL SUPPORT (OUTPATIENT)
Dept: REHABILITATION | Facility: HOSPITAL | Age: 68
End: 2023-06-08
Attending: ORTHOPAEDIC SURGERY
Payer: COMMERCIAL

## 2023-06-08 DIAGNOSIS — M25.511 ACUTE PAIN OF RIGHT SHOULDER: Primary | ICD-10-CM

## 2023-06-08 PROCEDURE — 97530 THERAPEUTIC ACTIVITIES: CPT

## 2023-06-08 PROCEDURE — 97110 THERAPEUTIC EXERCISES: CPT

## 2023-06-08 NOTE — PROGRESS NOTES
"  Occupational Therapy Daily Treatment Note     Date: 6/8/2023  Name: Fernando Thomas  Clinic Number: [id number]    Therapy Diagnosis: SHOULDER PAIN  Physician: John Campo DO     Physician Orders: evaluate and treat  Medical Diagnosis: right shoulder pain   Surgical Procedure and Date: none  Evaluation Date: 5/1/2023  Insurance Authorization Period Expiration: 5/1/2023 to 12/31/2023  Plan of Care Certification Period: 5/1/2023 to 7/1/2023   Date of Return to MD: n/a at this time  Visit # / Visits authorized: 8/12     Time In:3:30  Time Out: 4:30  Total Billable Time: 60 minutes    Precautions:  universal, see epic, protocol if applicable    Subjective     Pt reports: "I didn't do my stretches because I was sick"  he was compliant with home exercise program.  Response to previous treatment:did well w/ PT then had a setback.   Functional change: decreased ind w/ overhead use    Pain: 0/10 rest; 0/10 light use  Side:right  Location: anterior shoulder  Objective   Fernando received therapeutic exercises to develop strength, endurance, ROM, and flexibility for 60 minutes including:  ER STRETCHES IN SUPINE @ 45 DEGREES OF ABD FOR RIGHT SHOULDER FOLLOWED BY ER STRETCHES @0 DEGREES ABD IN STAND . 4 MIN X 2 TRIALS EACH.   UBE 8 MIN X  1 TRIAL . Isometric strengthening for shoulder flexion, ext, er and ir.    Patient performed wand exercises from supine for shoulder flexion.  Patient performed scapular squeeze exercises.  ER STRETCHES FROM 90 DEGREES ABD OF SHOULDER 3MIN HOLD X 2 TRIALS .     Home Exercises and Education Provided     Education provided:   STRETCHING EXERCISES FOR ER AT 45 AND 00 DEGREES OF ABD.       progress towards goals   likely tx progression  rationale of rehab interventions    Written Home Exercises/Information Provided: Patient instructed to cont prior HEP.      previously issued exercises and/or other issued home therapy instructions were reviewed if still part of current tx plan as well as any " issued today and Fernando was able to demonstrate them prior to the end of the session.  Fernando demonstrated Ind w/ understanding of the HEP provided.   See EMR under patient instructions and/or media for HEP issued today or in past    Assessment    Pt would continue to benefit from skilled OT in order to address ROM, PAIN, AND/OR DECREASED FUNCTIONAL STATUS SECONDARY TO CURRENT INJURY/DEBILITATING FACTOR.     Fernando is progressing well towards his goals and there are NO updates to goals at this time unless goals noted below are different than goals on previous notes or evaluation. Pt prognosis is GOOD  Pt will continue to benefit from skilled outpatient occupational therapy to address the deficits listed in the problem list on initial evaluation to provide pt/family education and to maximize pt's level of independence in the home and community environment.     Anticipated barriers to occupational therapy: NONE    Pt's spiritual, cultural and educational needs have been considered and pt is agreeable to plan of care and goals.        Goals:   stg to be met in 3 weeks   1. Patient will be I with HEP ongoing  2. Patient will have 2/10 pain with light use ongoing  3. Patient will have = prom of bilateral SHOULDERS  to enhance affected arm use with ADL ongoing        ltg to be met by d/c   1. Patient will be I with d/c HEP ongoing  2. Patient will have 0/10 pain with all use ongoing  all goals ongoing unless noted above or met today or in past but not noted yet ongoing          Any goals met today ?  (if any met see above)    Updates/Grading for next session: prn     Plan: evaluate and tx     Zoë FELTON Velazco

## 2023-06-13 ENCOUNTER — CLINICAL SUPPORT (OUTPATIENT)
Dept: REHABILITATION | Facility: HOSPITAL | Age: 68
End: 2023-06-13
Payer: COMMERCIAL

## 2023-06-13 DIAGNOSIS — M25.511 ACUTE PAIN OF RIGHT SHOULDER: Primary | ICD-10-CM

## 2023-06-13 PROCEDURE — 97110 THERAPEUTIC EXERCISES: CPT

## 2023-06-13 PROCEDURE — 97530 THERAPEUTIC ACTIVITIES: CPT

## 2023-06-13 NOTE — PROGRESS NOTES
"  Occupational Therapy Daily Treatment Note     Date: 6/13/2023  Name: Fernando Thomas  Clinic Number: [id number]    Therapy Diagnosis: SHOULDER PAIN  Physician: John Campo DO     Physician Orders: evaluate and treat  Medical Diagnosis: right shoulder pain   Surgical Procedure and Date: none  Evaluation Date: 5/1/2023  Insurance Authorization Period Expiration: 5/1/2023 to 12/31/2023  Plan of Care Certification Period: 5/1/2023 to 7/1/2023   Date of Return to MD: n/a at this time  Visit # / Visits authorized: 10/12     Time In:3:30  Time Out: 4:30  Total Billable Time: 60 minutes    Precautions:  universal, see epic, protocol if applicable    Subjective     Pt reports: "I really did well with my homework"  he was compliant with home exercise program.  Response to previous treatment:did well w/ PT then had a setback.   Functional change: decreased ind w/ overhead use    Pain: 0/10 rest; 0/10 light use  Side:right  Location: anterior shoulder  Objective   Fernando received therapeutic exercises to develop strength, endurance, ROM, and flexibility for 60 minutes including:  ER STRETCHES IN SUPINE @ 90 DEGREES OF ABD FOR RIGHT SHOULDER FOLLOWED BY ER STRETCHES @0 DEGREES ABD IN STAND . 4 MIN X 2 TRIALS EACH.   UBE 5 MIN X  1 TRIAL . Isometric strengthening for shoulder flexion, ext, er and ir.    Patient performed wand exercises from supine for shoulder flexion.  Patient performed scapular squeeze exercises.  ER STRETCHES FROM 90 DEGREES ABD OF SHOULDER 3MIN HOLD X 2 TRIALS .     Home Exercises and Education Provided     Education provided:   STRETCHING EXERCISES FOR ER AT 45 AND 00 DEGREES OF ABD.       progress towards goals   likely tx progression  rationale of rehab interventions    Written Home Exercises/Information Provided: Patient instructed to cont prior HEP.      previously issued exercises and/or other issued home therapy instructions were reviewed if still part of current tx plan as well as any issued " today and Fernando was able to demonstrate them prior to the end of the session.  Fernando demonstrated Ind w/ understanding of the HEP provided.   See EMR under patient instructions and/or media for HEP issued today or in past    Assessment    Pt would continue to benefit from skilled OT in order to address ROM, PAIN, AND/OR DECREASED FUNCTIONAL STATUS SECONDARY TO CURRENT INJURY/DEBILITATING FACTOR.     Fernando is progressing well towards his goals and there are NO updates to goals at this time unless goals noted below are different than goals on previous notes or evaluation. Pt prognosis is GOOD  Pt will continue to benefit from skilled outpatient occupational therapy to address the deficits listed in the problem list on initial evaluation to provide pt/family education and to maximize pt's level of independence in the home and community environment.     Anticipated barriers to occupational therapy: NONE    Pt's spiritual, cultural and educational needs have been considered and pt is agreeable to plan of care and goals.        Goals:   stg to be met in 3 weeks   1. Patient will be I with HEP ongoing  2. Patient will have 2/10 pain with light use ongoing  3. Patient will have = prom of bilateral SHOULDERS  to enhance affected arm use with ADL ongoing        ltg to be met by d/c   1. Patient will be I with d/c HEP ongoing  2. Patient will have 0/10 pain with all use ongoing  all goals ongoing unless noted above or met today or in past but not noted yet ongoing          Any goals met today ?  (if any met see above)    Updates/Grading for next session: prn     Plan: evaluate and tx     Zoë FELTON Velazco

## 2023-06-15 ENCOUNTER — CLINICAL SUPPORT (OUTPATIENT)
Dept: REHABILITATION | Facility: HOSPITAL | Age: 68
End: 2023-06-15
Payer: COMMERCIAL

## 2023-06-15 DIAGNOSIS — M25.511 ACUTE PAIN OF RIGHT SHOULDER: Primary | ICD-10-CM

## 2023-06-15 PROCEDURE — 97530 THERAPEUTIC ACTIVITIES: CPT

## 2023-06-15 PROCEDURE — 97110 THERAPEUTIC EXERCISES: CPT

## 2023-06-15 NOTE — PROGRESS NOTES
"  Occupational Therapy Daily Treatment Note     Date: 6/15/2023  Name: Fernando Thomas  Clinic Number: [id number]    Therapy Diagnosis: SHOULDER PAIN  Physician: John Campo DO     Physician Orders: evaluate and treat  Medical Diagnosis: right shoulder pain   Surgical Procedure and Date: none  Evaluation Date: 5/1/2023  Insurance Authorization Period Expiration: 5/1/2023 to 12/31/2023  Plan of Care Certification Period: 5/1/2023 to 7/1/2023   Date of Return to MD: n/a at this time  Visit # / Visits authorized: 11/12     Time In:3:30  Time Out: 4:30  Total Billable Time: 60 minutes    Precautions:  universal, see epic, protocol if applicable    Subjective     Pt reports: "I pressure washed yesterday"  he was compliant with home exercise program.  Response to previous treatment:did well w/ PT then had a setback.   Functional change: decreased ind w/ overhead use    Pain: 0/10 rest; 0/10 light use  Side:right  Location: anterior shoulder  Objective   Fernando received therapeutic exercises to develop strength, endurance, ROM, and flexibility for 60 minutes including:  ER STRETCHES IN SUPINE @ 90 DEGREES OF ABD FOR RIGHT SHOULDER FOLLOWED BY ER STRETCHES @0 DEGREES ABD IN STAND . 4 MIN X 2 TRIALS EACH.   UBE 5 MIN X  1 TRIAL . Isometric strengthening for shoulder flexion, ext, er and ir.    Patient performed wand exercises from supine for shoulder flexion.  Patient performed scapular squeeze exercises.  ER STRETCHES FROM 90 DEGREES ABD OF SHOULDER 3MIN HOLD X 2 TRIALS .   Flexion exercises w/ use of red theraband.   Home Exercises and Education Provided     Education provided:   STRETCHING EXERCISES FOR ER AT 45 AND 00 DEGREES OF ABD.       progress towards goals   likely tx progression  rationale of rehab interventions    Written Home Exercises/Information Provided: Patient instructed to cont prior HEP.      previously issued exercises and/or other issued home therapy instructions were reviewed if still part of " current tx plan as well as any issued today and Fernando was able to demonstrate them prior to the end of the session.  Fernando demonstrated Ind w/ understanding of the HEP provided.   See EMR under patient instructions and/or media for HEP issued today or in past    Assessment    Pt would continue to benefit from skilled OT in order to address ROM, PAIN, AND/OR DECREASED FUNCTIONAL STATUS SECONDARY TO CURRENT INJURY/DEBILITATING FACTOR.     Fernando is progressing well towards his goals and there are NO updates to goals at this time unless goals noted below are different than goals on previous notes or evaluation. Pt prognosis is GOOD  Pt will continue to benefit from skilled outpatient occupational therapy to address the deficits listed in the problem list on initial evaluation to provide pt/family education and to maximize pt's level of independence in the home and community environment.     Anticipated barriers to occupational therapy: NONE    Pt's spiritual, cultural and educational needs have been considered and pt is agreeable to plan of care and goals.        Goals:   stg to be met in 3 weeks   1. Patient will be I with HEP ongoing  2. Patient will have 2/10 pain with light use ongoing  3. Patient will have = prom of bilateral SHOULDERS  to enhance affected arm use with ADL ongoing        ltg to be met by d/c   1. Patient will be I with d/c HEP ongoing  2. Patient will have 0/10 pain with all use ongoing  all goals ongoing unless noted above or met today or in past but not noted yet ongoing          Any goals met today ?  (if any met see above)    Updates/Grading for next session: prn     Plan: evaluate and tx     Zoë FELTON Velazco

## 2023-06-20 ENCOUNTER — CLINICAL SUPPORT (OUTPATIENT)
Dept: REHABILITATION | Facility: HOSPITAL | Age: 68
End: 2023-06-20
Attending: ORTHOPAEDIC SURGERY
Payer: COMMERCIAL

## 2023-06-20 DIAGNOSIS — M25.511 ACUTE PAIN OF RIGHT SHOULDER: Primary | ICD-10-CM

## 2023-06-20 PROCEDURE — 97110 THERAPEUTIC EXERCISES: CPT

## 2023-06-20 PROCEDURE — 97530 THERAPEUTIC ACTIVITIES: CPT

## 2023-06-20 NOTE — PROGRESS NOTES
Occupational Therapy Discharge Summary    Name: Fernando Thomas 1955  MRN: [id number]   Date: 6/20/2023  Principal Problem: right shoulder pain     Subjective:  Patient Discharged from acute Occupational Therapy on 6/20/2023.  Please refer to prior OT noted date on 6/15/2023 for functional status.    Objective:patient performed all ER stretches.   GOALS:    Goals:   stg to be met in 3 weeks   1. Patient will be I with HEP met  2. Patient will have 2/10 pain with light use met  3. Patient will have = prom of bilateral SHOULDERS  to enhance affected arm use with ADL met        ltg to be met by d/c   1. Patient will be I with d/c HEP met  2. Patient will have 0/10 pain with all use met  all goals ongoing unless noted above or met today or in past but not noted yet ongoing  Assessment:  Patient has met all goals and is not appropriate for therapy.    Reasons for Discontinuation of Therapy Services  Satisfactory goal achievement.      Plan:  Patient Discharged to:  home w/ no ot required .    Adebayo Velazco, OT

## 2023-08-15 ENCOUNTER — OFFICE VISIT (OUTPATIENT)
Dept: FAMILY MEDICINE | Facility: CLINIC | Age: 68
End: 2023-08-15
Payer: COMMERCIAL

## 2023-08-15 VITALS
HEART RATE: 73 BPM | TEMPERATURE: 98 F | DIASTOLIC BLOOD PRESSURE: 74 MMHG | SYSTOLIC BLOOD PRESSURE: 124 MMHG | RESPIRATION RATE: 16 BRPM | WEIGHT: 173.38 LBS | OXYGEN SATURATION: 96 % | BODY MASS INDEX: 27.86 KG/M2 | HEIGHT: 66 IN

## 2023-08-15 DIAGNOSIS — Z00.00 ANNUAL PHYSICAL EXAM: Primary | ICD-10-CM

## 2023-08-15 DIAGNOSIS — Z12.11 SCREENING FOR COLON CANCER: ICD-10-CM

## 2023-08-15 DIAGNOSIS — Z12.5 SCREENING FOR PROSTATE CANCER: ICD-10-CM

## 2023-08-15 DIAGNOSIS — Z23 NEED FOR SHINGLES VACCINE: ICD-10-CM

## 2023-08-15 PROCEDURE — 90750 ZOSTER RECOMBINANT VACCINE: ICD-10-PCS | Mod: S$GLB,,, | Performed by: FAMILY MEDICINE

## 2023-08-15 PROCEDURE — 3078F PR MOST RECENT DIASTOLIC BLOOD PRESSURE < 80 MM HG: ICD-10-PCS | Mod: S$GLB,,, | Performed by: FAMILY MEDICINE

## 2023-08-15 PROCEDURE — 1101F PT FALLS ASSESS-DOCD LE1/YR: CPT | Mod: S$GLB,,, | Performed by: FAMILY MEDICINE

## 2023-08-15 PROCEDURE — 1126F PR PAIN SEVERITY QUANTIFIED, NO PAIN PRESENT: ICD-10-PCS | Mod: S$GLB,,, | Performed by: FAMILY MEDICINE

## 2023-08-15 PROCEDURE — 3288F FALL RISK ASSESSMENT DOCD: CPT | Mod: S$GLB,,, | Performed by: FAMILY MEDICINE

## 2023-08-15 PROCEDURE — 99397 PER PM REEVAL EST PAT 65+ YR: CPT | Mod: 25,S$GLB,, | Performed by: FAMILY MEDICINE

## 2023-08-15 PROCEDURE — 90471 IMMUNIZATION ADMIN: CPT | Mod: S$GLB,,, | Performed by: FAMILY MEDICINE

## 2023-08-15 PROCEDURE — 90471 ZOSTER RECOMBINANT VACCINE: ICD-10-PCS | Mod: S$GLB,,, | Performed by: FAMILY MEDICINE

## 2023-08-15 PROCEDURE — 3074F SYST BP LT 130 MM HG: CPT | Mod: S$GLB,,, | Performed by: FAMILY MEDICINE

## 2023-08-15 PROCEDURE — 1126F AMNT PAIN NOTED NONE PRSNT: CPT | Mod: S$GLB,,, | Performed by: FAMILY MEDICINE

## 2023-08-15 PROCEDURE — 3288F PR FALLS RISK ASSESSMENT DOCUMENTED: ICD-10-PCS | Mod: S$GLB,,, | Performed by: FAMILY MEDICINE

## 2023-08-15 PROCEDURE — 3078F DIAST BP <80 MM HG: CPT | Mod: S$GLB,,, | Performed by: FAMILY MEDICINE

## 2023-08-15 PROCEDURE — 99999 PR PBB SHADOW E&M-EST. PATIENT-LVL IV: ICD-10-PCS | Mod: PBBFAC,,, | Performed by: FAMILY MEDICINE

## 2023-08-15 PROCEDURE — 1160F PR REVIEW ALL MEDS BY PRESCRIBER/CLIN PHARMACIST DOCUMENTED: ICD-10-PCS | Mod: S$GLB,,, | Performed by: FAMILY MEDICINE

## 2023-08-15 PROCEDURE — 3008F PR BODY MASS INDEX (BMI) DOCUMENTED: ICD-10-PCS | Mod: S$GLB,,, | Performed by: FAMILY MEDICINE

## 2023-08-15 PROCEDURE — 99999 PR PBB SHADOW E&M-EST. PATIENT-LVL IV: CPT | Mod: PBBFAC,,, | Performed by: FAMILY MEDICINE

## 2023-08-15 PROCEDURE — 1159F MED LIST DOCD IN RCRD: CPT | Mod: S$GLB,,, | Performed by: FAMILY MEDICINE

## 2023-08-15 PROCEDURE — 90750 HZV VACC RECOMBINANT IM: CPT | Mod: S$GLB,,, | Performed by: FAMILY MEDICINE

## 2023-08-15 PROCEDURE — 1159F PR MEDICATION LIST DOCUMENTED IN MEDICAL RECORD: ICD-10-PCS | Mod: S$GLB,,, | Performed by: FAMILY MEDICINE

## 2023-08-15 PROCEDURE — 99397 PR PREVENTIVE VISIT,EST,65 & OVER: ICD-10-PCS | Mod: 25,S$GLB,, | Performed by: FAMILY MEDICINE

## 2023-08-15 PROCEDURE — 3008F BODY MASS INDEX DOCD: CPT | Mod: S$GLB,,, | Performed by: FAMILY MEDICINE

## 2023-08-15 PROCEDURE — 3074F PR MOST RECENT SYSTOLIC BLOOD PRESSURE < 130 MM HG: ICD-10-PCS | Mod: S$GLB,,, | Performed by: FAMILY MEDICINE

## 2023-08-15 PROCEDURE — 1160F RVW MEDS BY RX/DR IN RCRD: CPT | Mod: S$GLB,,, | Performed by: FAMILY MEDICINE

## 2023-08-15 PROCEDURE — 1101F PR PT FALLS ASSESS DOC 0-1 FALLS W/OUT INJ PAST YR: ICD-10-PCS | Mod: S$GLB,,, | Performed by: FAMILY MEDICINE

## 2023-08-15 NOTE — PROGRESS NOTES
"[facility] - Clinic Note    Subjective      Mr. Thomas is a 67 y.o. male who presents to clinic for Follow-up     patient has been feeling well overall.  Has been having some plantar fasciitis but mild.    PMH Fernando has a past medical history of Mixed hyperlipidemia.   PSXH Fernando has a past surgical history that includes Carpal tunnel release (Bilateral); Hernia repair; and Knee Arthroplasty (Right).   FH Fernando's family history includes Cancer in his father and mother.   SH Fernando reports that he has never smoked. He has never used smokeless tobacco. He reports that he does not currently use alcohol. He reports that he does not use drugs.   ALG Fernando is allergic to augmentin [amoxicillin-pot clavulanate] and biaxin [clarithromycin].   MED Fernando has a current medication list which includes the following prescription(s): atorvastatin, fish oil-omega-3 fatty acids, vitamin d, and gabapentin.     Review of Systems   Constitutional:  Negative for chills and fever.   HENT:  Negative for congestion and rhinorrhea.    Eyes:  Negative for visual disturbance.   Respiratory:  Negative for cough and shortness of breath.    Cardiovascular:  Negative for chest pain.   Gastrointestinal:  Negative for abdominal pain, constipation, diarrhea, nausea and vomiting.   Genitourinary:  Negative for dysuria.   Musculoskeletal:  Negative for myalgias.   Skin:  Negative for rash.   Neurological:  Negative for weakness and headaches.     Objective     /74 (BP Location: Left arm, Patient Position: Sitting, BP Method: Large (Manual))   Pulse 73   Temp 97.8 °F (36.6 °C) (Temporal)   Resp 16   Ht 5' 6" (1.676 m)   Wt 78.7 kg (173 lb 6.4 oz)   SpO2 96%   BMI 27.99 kg/m²     Physical Exam  Vitals and nursing note reviewed.   Constitutional:       General: He is not in acute distress.     Appearance: Normal appearance. He is well-developed. He is not diaphoretic.   HENT:      Head: Normocephalic and atraumatic.      " Right Ear: External ear normal.      Left Ear: External ear normal.   Eyes:      General:         Right eye: No discharge.         Left eye: No discharge.   Cardiovascular:      Rate and Rhythm: Normal rate and regular rhythm.      Heart sounds: Normal heart sounds.   Pulmonary:      Effort: Pulmonary effort is normal.      Breath sounds: Normal breath sounds. No wheezing or rales.   Skin:     General: Skin is warm and dry.   Neurological:      Mental Status: He is alert and oriented to person, place, and time. Mental status is at baseline.   Psychiatric:         Mood and Affect: Mood normal.         Behavior: Behavior normal.         Thought Content: Thought content normal.         Judgment: Judgment normal.        Assessment/Plan     1. Annual physical exam  Lipid Panel    Comprehensive Metabolic Panel    CBC Auto Differential    Hemoglobin A1C      2. Screening for prostate cancer  PSA, Screening      3. Screening for colon cancer  Ambulatory referral/consult to Gastroenterology      4. Need for shingles vaccine  (In Office Administered) Zoster Recombinant Vaccine        Due for yearly labs as above.  Set up for colonoscopy.  Due for initial zoster vaccination today.  Follow-up in 6 months.    Future Appointments   Date Time Provider Department Center   11/15/2023  3:30 PM NURSE SCHEDULE, [facility] FAM[facility] MEDICINE [facility] KAYLEY Huerta MD  Family Medicine  [facility]

## 2023-08-24 ENCOUNTER — LAB VISIT (OUTPATIENT)
Dept: LAB | Facility: HOSPITAL | Age: 68
End: 2023-08-24
Attending: FAMILY MEDICINE
Payer: COMMERCIAL

## 2023-08-24 DIAGNOSIS — Z00.00 ANNUAL PHYSICAL EXAM: ICD-10-CM

## 2023-08-24 DIAGNOSIS — Z12.5 SCREENING FOR PROSTATE CANCER: ICD-10-CM

## 2023-08-24 LAB
ALBUMIN SERPL BCP-MCNC: 4.1 G/DL (ref 3.5–5.2)
ALP SERPL-CCNC: 53 U/L (ref 55–135)
ALT SERPL W/O P-5'-P-CCNC: 22 U/L (ref 10–44)
ANION GAP SERPL CALC-SCNC: 11 MMOL/L (ref 8–16)
AST SERPL-CCNC: 18 U/L (ref 10–40)
BASOPHILS # BLD AUTO: 0.05 K/UL (ref 0–0.2)
BASOPHILS NFR BLD: 1 % (ref 0–1.9)
BILIRUB SERPL-MCNC: 0.5 MG/DL (ref 0.1–1)
BUN SERPL-MCNC: 16 MG/DL (ref 8–23)
CALCIUM SERPL-MCNC: 9.6 MG/DL (ref 8.7–10.5)
CHLORIDE SERPL-SCNC: 103 MMOL/L (ref 95–110)
CHOLEST SERPL-MCNC: 182 MG/DL (ref 120–199)
CHOLEST/HDLC SERPL: 4.4 {RATIO} (ref 2–5)
CO2 SERPL-SCNC: 25 MMOL/L (ref 23–29)
COMPLEXED PSA SERPL-MCNC: 1.1 NG/ML (ref 0–4)
CREAT SERPL-MCNC: 1 MG/DL (ref 0.5–1.4)
DIFFERENTIAL METHOD: ABNORMAL
EOSINOPHIL # BLD AUTO: 0.2 K/UL (ref 0–0.5)
EOSINOPHIL NFR BLD: 3.3 % (ref 0–8)
ERYTHROCYTE [DISTWIDTH] IN BLOOD BY AUTOMATED COUNT: 12.9 % (ref 11.5–14.5)
EST. GFR  (NO RACE VARIABLE): >60 ML/MIN/1.73 M^2
ESTIMATED AVG GLUCOSE: 111 MG/DL (ref 68–131)
GLUCOSE SERPL-MCNC: 104 MG/DL (ref 70–110)
HBA1C MFR BLD: 5.5 % (ref 4–5.6)
HCT VFR BLD AUTO: 45.6 % (ref 40–54)
HDLC SERPL-MCNC: 41 MG/DL (ref 40–75)
HDLC SERPL: 22.5 % (ref 20–50)
HGB BLD-MCNC: 15.7 G/DL (ref 14–18)
IMM GRANULOCYTES # BLD AUTO: 0.03 K/UL (ref 0–0.04)
IMM GRANULOCYTES NFR BLD AUTO: 0.6 % (ref 0–0.5)
LDLC SERPL CALC-MCNC: 131.2 MG/DL (ref 63–159)
LYMPHOCYTES # BLD AUTO: 2 K/UL (ref 1–4.8)
LYMPHOCYTES NFR BLD: 38.8 % (ref 18–48)
MCH RBC QN AUTO: 29 PG (ref 27–31)
MCHC RBC AUTO-ENTMCNC: 34.4 G/DL (ref 32–36)
MCV RBC AUTO: 84 FL (ref 82–98)
MONOCYTES # BLD AUTO: 0.5 K/UL (ref 0.3–1)
MONOCYTES NFR BLD: 8.7 % (ref 4–15)
NEUTROPHILS # BLD AUTO: 2.5 K/UL (ref 1.8–7.7)
NEUTROPHILS NFR BLD: 47.6 % (ref 38–73)
NONHDLC SERPL-MCNC: 141 MG/DL
NRBC BLD-RTO: 0 /100 WBC
PLATELET # BLD AUTO: 185 K/UL (ref 150–450)
PMV BLD AUTO: 10.7 FL (ref 9.2–12.9)
POTASSIUM SERPL-SCNC: 4.4 MMOL/L (ref 3.5–5.1)
PROT SERPL-MCNC: 7.6 G/DL (ref 6–8.4)
RBC # BLD AUTO: 5.41 M/UL (ref 4.6–6.2)
SODIUM SERPL-SCNC: 139 MMOL/L (ref 136–145)
TRIGL SERPL-MCNC: 49 MG/DL (ref 30–150)
WBC # BLD AUTO: 5.16 K/UL (ref 3.9–12.7)

## 2023-08-24 PROCEDURE — 83036 HEMOGLOBIN GLYCOSYLATED A1C: CPT | Performed by: FAMILY MEDICINE

## 2023-08-24 PROCEDURE — 80061 LIPID PANEL: CPT | Performed by: FAMILY MEDICINE

## 2023-08-24 PROCEDURE — 80053 COMPREHEN METABOLIC PANEL: CPT | Performed by: FAMILY MEDICINE

## 2023-08-24 PROCEDURE — 85025 COMPLETE CBC W/AUTO DIFF WBC: CPT | Performed by: FAMILY MEDICINE

## 2023-08-24 PROCEDURE — 84153 ASSAY OF PSA TOTAL: CPT | Performed by: FAMILY MEDICINE

## 2023-08-24 PROCEDURE — 36415 COLL VENOUS BLD VENIPUNCTURE: CPT | Performed by: FAMILY MEDICINE

## 2023-11-15 ENCOUNTER — CLINICAL SUPPORT (OUTPATIENT)
Dept: FAMILY MEDICINE | Facility: CLINIC | Age: 68
End: 2023-11-15
Payer: COMMERCIAL

## 2023-11-15 DIAGNOSIS — Z23 NEED FOR SHINGLES VACCINE: Primary | ICD-10-CM

## 2023-11-15 PROCEDURE — 90750 HZV VACC RECOMBINANT IM: CPT | Mod: S$GLB,,, | Performed by: FAMILY MEDICINE

## 2023-11-15 PROCEDURE — 90471 ZOSTER RECOMBINANT VACCINE: ICD-10-PCS | Mod: S$GLB,,, | Performed by: FAMILY MEDICINE

## 2023-11-15 PROCEDURE — 90471 IMMUNIZATION ADMIN: CPT | Mod: S$GLB,,, | Performed by: FAMILY MEDICINE

## 2023-11-15 PROCEDURE — 90750 ZOSTER RECOMBINANT VACCINE: ICD-10-PCS | Mod: S$GLB,,, | Performed by: FAMILY MEDICINE

## 2023-11-15 NOTE — PROGRESS NOTES
After obtaining informed consent, the immunization is given by AA, given in left deltoid, tolerated well, instructed patient to wait for 15 minutes current VIS given.

## 2024-07-10 ENCOUNTER — OFFICE VISIT (OUTPATIENT)
Dept: FAMILY MEDICINE | Facility: CLINIC | Age: 69
End: 2024-07-10
Payer: COMMERCIAL

## 2024-07-10 VITALS
OXYGEN SATURATION: 97 % | WEIGHT: 171.38 LBS | HEIGHT: 66 IN | BODY MASS INDEX: 27.54 KG/M2 | DIASTOLIC BLOOD PRESSURE: 84 MMHG | SYSTOLIC BLOOD PRESSURE: 102 MMHG | HEART RATE: 73 BPM

## 2024-07-10 DIAGNOSIS — Z00.00 ROUTINE GENERAL MEDICAL EXAMINATION AT A HEALTH CARE FACILITY: ICD-10-CM

## 2024-07-10 DIAGNOSIS — Z11.59 ENCOUNTER FOR SCREENING FOR VIRAL DISEASE: ICD-10-CM

## 2024-07-10 DIAGNOSIS — Z12.5 ENCOUNTER FOR PROSTATE CANCER SCREENING: ICD-10-CM

## 2024-07-10 DIAGNOSIS — R79.9 ABNORMAL BLOOD CHEMISTRY: ICD-10-CM

## 2024-07-10 DIAGNOSIS — Z12.11 ENCOUNTER FOR SCREENING FOR MALIGNANT NEOPLASM OF COLON: Primary | ICD-10-CM

## 2024-07-10 DIAGNOSIS — Z13.6 ENCOUNTER FOR LIPID SCREENING FOR CARDIOVASCULAR DISEASE: ICD-10-CM

## 2024-07-10 DIAGNOSIS — Z13.220 ENCOUNTER FOR LIPID SCREENING FOR CARDIOVASCULAR DISEASE: ICD-10-CM

## 2024-07-10 PROCEDURE — 3008F BODY MASS INDEX DOCD: CPT | Mod: S$GLB,,, | Performed by: STUDENT IN AN ORGANIZED HEALTH CARE EDUCATION/TRAINING PROGRAM

## 2024-07-10 PROCEDURE — 3288F FALL RISK ASSESSMENT DOCD: CPT | Mod: S$GLB,,, | Performed by: STUDENT IN AN ORGANIZED HEALTH CARE EDUCATION/TRAINING PROGRAM

## 2024-07-10 PROCEDURE — 1159F MED LIST DOCD IN RCRD: CPT | Mod: S$GLB,,, | Performed by: STUDENT IN AN ORGANIZED HEALTH CARE EDUCATION/TRAINING PROGRAM

## 2024-07-10 PROCEDURE — 99999 PR PBB SHADOW E&M-EST. PATIENT-LVL III: CPT | Mod: PBBFAC,,, | Performed by: STUDENT IN AN ORGANIZED HEALTH CARE EDUCATION/TRAINING PROGRAM

## 2024-07-10 PROCEDURE — 99214 OFFICE O/P EST MOD 30 MIN: CPT | Mod: S$GLB,,, | Performed by: STUDENT IN AN ORGANIZED HEALTH CARE EDUCATION/TRAINING PROGRAM

## 2024-07-10 PROCEDURE — 3079F DIAST BP 80-89 MM HG: CPT | Mod: S$GLB,,, | Performed by: STUDENT IN AN ORGANIZED HEALTH CARE EDUCATION/TRAINING PROGRAM

## 2024-07-10 PROCEDURE — 3074F SYST BP LT 130 MM HG: CPT | Mod: S$GLB,,, | Performed by: STUDENT IN AN ORGANIZED HEALTH CARE EDUCATION/TRAINING PROGRAM

## 2024-07-10 PROCEDURE — 1126F AMNT PAIN NOTED NONE PRSNT: CPT | Mod: S$GLB,,, | Performed by: STUDENT IN AN ORGANIZED HEALTH CARE EDUCATION/TRAINING PROGRAM

## 2024-07-10 PROCEDURE — 1101F PT FALLS ASSESS-DOCD LE1/YR: CPT | Mod: S$GLB,,, | Performed by: STUDENT IN AN ORGANIZED HEALTH CARE EDUCATION/TRAINING PROGRAM

## 2024-07-10 NOTE — PROGRESS NOTES
[facility] Clinic Note    Subjective:    The HPI and pertinent ROS is included in the Diagnostic Impression Remarks section at the end of the note. Please see below for further details. Chief complaint is at end of note.     Fernando is a pleasant intelligent patient who is here for evaluation.     Modified Medications    No medications on file       Data reviewed 274}  Previous medical records reviewed and summarized in plan section at end of note.      If you are due for any health screening(s) below please notify me so we can arrange them to be ordered and scheduled. Most healthy patients at your age complete them, but you are free to accept or refuse. If you can't do it, I'll definitely understand. If you can, I'd certainly appreciate it!     Tests to Keep You Healthy    Colon Cancer Screening: DUE      The following portions of the patient's history were reviewed and updated as appropriate: allergies, current medications, past family history, past medical history, past social history, past surgical history and problem list.    He  has a past medical history of Mixed hyperlipidemia.  He  has a past surgical history that includes Carpal tunnel release (Bilateral); Hernia repair; and Knee Arthroplasty (Right).    He  reports that he has never smoked. He has never used smokeless tobacco. He reports that he does not currently use alcohol. He reports that he does not use drugs.  He family history includes Cancer in his father and mother.    Review of patient's allergies indicates:   Allergen Reactions    Augmentin [amoxicillin-pot clavulanate] Nausea And Vomiting    Biaxin [clarithromycin]        Tobacco Use: Low Risk  (7/10/2024)    Patient History     Smoking Tobacco Use: Never     Smokeless Tobacco Use: Never     Passive Exposure: Not on file     Physical Examination  General appearance: alert, cooperative, no distress  Neck: no thyromegaly, no neck stiffness  Lungs: clear to auscultation, no wheezes,  "rales or rhonchi, symmetric air entry  Heart: normal rate, regular rhythm, normal S1, S2, no murmurs, rubs, clicks or gallops  Abdomen: soft, nontender, nondistended, no rigidity, rebound, or guarding.   Back: no point tenderness over spine  Extremities: peripheral pulses normal, no unilateral leg swelling or calf tenderness   Neurological:alert, oriented, normal speech, no new focal findings or movement disorder noted from baseline    BP Readings from Last 3 Encounters:   07/10/24 102/84   08/15/23 124/74   06/05/23 124/76     Wt Readings from Last 3 Encounters:   07/10/24 77.7 kg (171 lb 6.4 oz)   08/15/23 78.7 kg (173 lb 6.4 oz)   06/05/23 79.4 kg (175 lb)     /84 (BP Location: Left arm, Patient Position: Sitting, BP Method: Medium (Manual))   Pulse 73   Ht 5' 6" (1.676 m)   Wt 77.7 kg (171 lb 6.4 oz)   SpO2 97%   BMI 27.66 kg/m²    274}  Laboratory: I have reviewed old labs below:    274}    Lab Results   Component Value Date    WBC 5.16 08/24/2023    HGB 15.7 08/24/2023    HCT 45.6 08/24/2023    MCV 84 08/24/2023     08/24/2023     08/24/2023    K 4.4 08/24/2023     08/24/2023    CALCIUM 9.6 08/24/2023    CO2 25 08/24/2023     08/24/2023    BUN 16 08/24/2023    CREATININE 1.0 08/24/2023    EGFRNORACEVR >60.0 08/24/2023    ANIONGAP 11 08/24/2023    PROT 7.6 08/24/2023    ALBUMIN 4.1 08/24/2023    BILITOT 0.5 08/24/2023    ALKPHOS 53 (L) 08/24/2023    ALT 22 08/24/2023    AST 18 08/24/2023    CHOL 182 08/24/2023    TRIG 49 08/24/2023    HDL 41 08/24/2023    LDLCALC 131.2 08/24/2023    TSH 1.666 11/24/2021    PSA 1.1 08/24/2023    HGBA1C 5.5 08/24/2023      Lab reviewed by me: Particular labs of significance that I will monitor, workup, or treat to improve are mentioned below in diagnostic impression remarks.    Imaging/EKG: I have reviewed the pertinent results and my findings are noted in remarks.  274}    CC: No chief complaint on file.       274}    Assessment/Plan  Fernando " "Martha is a 68 y.o. male who presents to clinic with:  1. Encounter for screening for malignant neoplasm of colon    2. Routine general medical examination at a health care facility    3. Encounter for lipid screening for cardiovascular disease    4. Abnormal blood chemistry    5. Encounter for prostate cancer screening    6. Encounter for screening for viral disease       274}  Diagnostic Impression Remarks + HPI     Documentation entered by me for this encounter may have been done in part using speech-recognition technology. Although I have made an effort to ensure accuracy, "sound like" errors may exist and should be interpreted in context.      Patient presents today to establish care and check up   No complaints today today  Patient otherwise healthy  Patient had HLD previously but improved w/ atorvastatin 80 mg.  Now is controlled w/ diet    Additional workup planned: see labs ordered below.    See below for labs and meds ordered with associated diagnosis      1. Encounter for screening for malignant neoplasm of colon  - CBC Auto Differential; Future  - Comprehensive Metabolic Panel; Future  - TSH; Future  - Ambulatory referral/consult to Gastroenterology; Future    2. Routine general medical examination at a health care facility  - CBC Auto Differential; Future  - Comprehensive Metabolic Panel; Future  - TSH; Future    3. Encounter for lipid screening for cardiovascular disease  - CBC Auto Differential; Future  - Comprehensive Metabolic Panel; Future  - Lipid Panel; Future  - TSH; Future    4. Abnormal blood chemistry  - CBC Auto Differential; Future  - Comprehensive Metabolic Panel; Future  - Hemoglobin A1C; Future  - TSH; Future    5. Encounter for prostate cancer screening  - CBC Auto Differential; Future  - Comprehensive Metabolic Panel; Future  - PSA, Screening; Future    6. Encounter for screening for viral disease  - CBC Auto Differential; Future  - Comprehensive Metabolic Panel; Future  - Hepatitis " C Antibody; Future  - HIV 1/2 Ag/Ab (4th Gen); Future      Lisbet Campbell MD   274}    If you are due for any health screening(s) below please notify me so we can arrange them to be ordered and scheduled. Most healthy patients at your age complete them, but you are free to accept or refuse.     If you can't do it, I'll definitely understand. If you can, I'd certainly appreciate it!   Tests to Keep You Healthy    Colon Cancer Screening: DUE

## 2024-08-09 ENCOUNTER — LAB VISIT (OUTPATIENT)
Dept: LAB | Facility: HOSPITAL | Age: 69
End: 2024-08-09
Attending: STUDENT IN AN ORGANIZED HEALTH CARE EDUCATION/TRAINING PROGRAM
Payer: COMMERCIAL

## 2024-08-09 DIAGNOSIS — Z00.00 ROUTINE GENERAL MEDICAL EXAMINATION AT A HEALTH CARE FACILITY: ICD-10-CM

## 2024-08-09 DIAGNOSIS — Z12.11 ENCOUNTER FOR SCREENING FOR MALIGNANT NEOPLASM OF COLON: ICD-10-CM

## 2024-08-09 DIAGNOSIS — Z11.59 ENCOUNTER FOR SCREENING FOR VIRAL DISEASE: ICD-10-CM

## 2024-08-09 DIAGNOSIS — Z12.5 ENCOUNTER FOR PROSTATE CANCER SCREENING: ICD-10-CM

## 2024-08-09 DIAGNOSIS — Z13.6 ENCOUNTER FOR LIPID SCREENING FOR CARDIOVASCULAR DISEASE: ICD-10-CM

## 2024-08-09 DIAGNOSIS — E78.5 HYPERLIPIDEMIA, UNSPECIFIED HYPERLIPIDEMIA TYPE: Primary | ICD-10-CM

## 2024-08-09 DIAGNOSIS — Z13.220 ENCOUNTER FOR LIPID SCREENING FOR CARDIOVASCULAR DISEASE: ICD-10-CM

## 2024-08-09 DIAGNOSIS — R79.9 ABNORMAL BLOOD CHEMISTRY: ICD-10-CM

## 2024-08-09 LAB
ALBUMIN SERPL BCP-MCNC: 3.8 G/DL (ref 3.5–5.2)
ALP SERPL-CCNC: 50 U/L (ref 55–135)
ALT SERPL W/O P-5'-P-CCNC: 33 U/L (ref 10–44)
ANION GAP SERPL CALC-SCNC: 11 MMOL/L (ref 8–16)
AST SERPL-CCNC: 24 U/L (ref 10–40)
BASOPHILS # BLD AUTO: 0.06 K/UL (ref 0–0.2)
BASOPHILS NFR BLD: 1.2 % (ref 0–1.9)
BILIRUB SERPL-MCNC: 0.6 MG/DL (ref 0.1–1)
BUN SERPL-MCNC: 17 MG/DL (ref 8–23)
CALCIUM SERPL-MCNC: 9.2 MG/DL (ref 8.7–10.5)
CHLORIDE SERPL-SCNC: 105 MMOL/L (ref 95–110)
CHOLEST SERPL-MCNC: 221 MG/DL (ref 120–199)
CHOLEST/HDLC SERPL: 3.9 {RATIO} (ref 2–5)
CO2 SERPL-SCNC: 24 MMOL/L (ref 23–29)
COMPLEXED PSA SERPL-MCNC: 0.74 NG/ML (ref 0–4)
CREAT SERPL-MCNC: 1 MG/DL (ref 0.5–1.4)
DIFFERENTIAL METHOD BLD: NORMAL
EOSINOPHIL # BLD AUTO: 0.2 K/UL (ref 0–0.5)
EOSINOPHIL NFR BLD: 3.7 % (ref 0–8)
ERYTHROCYTE [DISTWIDTH] IN BLOOD BY AUTOMATED COUNT: 13.4 % (ref 11.5–14.5)
EST. GFR  (NO RACE VARIABLE): >60 ML/MIN/1.73 M^2
ESTIMATED AVG GLUCOSE: 117 MG/DL (ref 68–131)
GLUCOSE SERPL-MCNC: 104 MG/DL (ref 70–110)
HBA1C MFR BLD: 5.7 % (ref 4–5.6)
HCT VFR BLD AUTO: 44.4 % (ref 40–54)
HCV AB SERPL QL IA: NORMAL
HDLC SERPL-MCNC: 56 MG/DL (ref 40–75)
HDLC SERPL: 25.3 % (ref 20–50)
HGB BLD-MCNC: 15 G/DL (ref 14–18)
HIV 1+2 AB+HIV1 P24 AG SERPL QL IA: NORMAL
IMM GRANULOCYTES # BLD AUTO: 0.01 K/UL (ref 0–0.04)
IMM GRANULOCYTES NFR BLD AUTO: 0.2 % (ref 0–0.5)
LDLC SERPL CALC-MCNC: 153 MG/DL (ref 63–159)
LYMPHOCYTES # BLD AUTO: 1.8 K/UL (ref 1–4.8)
LYMPHOCYTES NFR BLD: 35.9 % (ref 18–48)
MCH RBC QN AUTO: 29 PG (ref 27–31)
MCHC RBC AUTO-ENTMCNC: 33.8 G/DL (ref 32–36)
MCV RBC AUTO: 86 FL (ref 82–98)
MONOCYTES # BLD AUTO: 0.6 K/UL (ref 0.3–1)
MONOCYTES NFR BLD: 11.5 % (ref 4–15)
NEUTROPHILS # BLD AUTO: 2.4 K/UL (ref 1.8–7.7)
NEUTROPHILS NFR BLD: 47.5 % (ref 38–73)
NONHDLC SERPL-MCNC: 165 MG/DL
NRBC BLD-RTO: 0 /100 WBC
PLATELET # BLD AUTO: 187 K/UL (ref 150–450)
PMV BLD AUTO: 11 FL (ref 9.2–12.9)
POTASSIUM SERPL-SCNC: 3.9 MMOL/L (ref 3.5–5.1)
PROT SERPL-MCNC: 7.3 G/DL (ref 6–8.4)
RBC # BLD AUTO: 5.18 M/UL (ref 4.6–6.2)
SODIUM SERPL-SCNC: 140 MMOL/L (ref 136–145)
TRIGL SERPL-MCNC: 60 MG/DL (ref 30–150)
TSH SERPL DL<=0.005 MIU/L-ACNC: 1.87 UIU/ML (ref 0.4–4)
WBC # BLD AUTO: 5.12 K/UL (ref 3.9–12.7)

## 2024-08-09 PROCEDURE — 85025 COMPLETE CBC W/AUTO DIFF WBC: CPT | Performed by: STUDENT IN AN ORGANIZED HEALTH CARE EDUCATION/TRAINING PROGRAM

## 2024-08-09 PROCEDURE — 83036 HEMOGLOBIN GLYCOSYLATED A1C: CPT | Performed by: STUDENT IN AN ORGANIZED HEALTH CARE EDUCATION/TRAINING PROGRAM

## 2024-08-09 PROCEDURE — 84153 ASSAY OF PSA TOTAL: CPT | Performed by: STUDENT IN AN ORGANIZED HEALTH CARE EDUCATION/TRAINING PROGRAM

## 2024-08-09 PROCEDURE — 36415 COLL VENOUS BLD VENIPUNCTURE: CPT | Performed by: STUDENT IN AN ORGANIZED HEALTH CARE EDUCATION/TRAINING PROGRAM

## 2024-08-09 PROCEDURE — 80053 COMPREHEN METABOLIC PANEL: CPT | Performed by: STUDENT IN AN ORGANIZED HEALTH CARE EDUCATION/TRAINING PROGRAM

## 2024-08-09 PROCEDURE — 80061 LIPID PANEL: CPT | Performed by: STUDENT IN AN ORGANIZED HEALTH CARE EDUCATION/TRAINING PROGRAM

## 2024-08-09 PROCEDURE — 87389 HIV-1 AG W/HIV-1&-2 AB AG IA: CPT | Performed by: STUDENT IN AN ORGANIZED HEALTH CARE EDUCATION/TRAINING PROGRAM

## 2024-08-09 PROCEDURE — 84443 ASSAY THYROID STIM HORMONE: CPT | Performed by: STUDENT IN AN ORGANIZED HEALTH CARE EDUCATION/TRAINING PROGRAM

## 2024-08-09 PROCEDURE — 86803 HEPATITIS C AB TEST: CPT | Performed by: STUDENT IN AN ORGANIZED HEALTH CARE EDUCATION/TRAINING PROGRAM

## 2024-08-09 RX ORDER — ATORVASTATIN CALCIUM 80 MG/1
80 TABLET, FILM COATED ORAL DAILY
Qty: 90 TABLET | Refills: 3 | Status: SHIPPED | OUTPATIENT
Start: 2024-08-09 | End: 2025-08-09

## 2025-04-25 ENCOUNTER — PATIENT MESSAGE (OUTPATIENT)
Dept: ADMINISTRATIVE | Facility: HOSPITAL | Age: 70
End: 2025-04-25
Payer: COMMERCIAL

## 2025-05-22 ENCOUNTER — OFFICE VISIT (OUTPATIENT)
Dept: FAMILY MEDICINE | Facility: CLINIC | Age: 70
End: 2025-05-22
Payer: COMMERCIAL

## 2025-05-22 VITALS
DIASTOLIC BLOOD PRESSURE: 70 MMHG | WEIGHT: 174.63 LBS | HEIGHT: 66 IN | OXYGEN SATURATION: 97 % | SYSTOLIC BLOOD PRESSURE: 120 MMHG | HEART RATE: 77 BPM | BODY MASS INDEX: 28.06 KG/M2

## 2025-05-22 DIAGNOSIS — E78.5 HYPERLIPIDEMIA, UNSPECIFIED HYPERLIPIDEMIA TYPE: ICD-10-CM

## 2025-05-22 DIAGNOSIS — E55.9 VITAMIN D INSUFFICIENCY: Primary | ICD-10-CM

## 2025-05-22 DIAGNOSIS — E66.3 OVERWEIGHT WITH BODY MASS INDEX (BMI) OF 28 TO 28.9 IN ADULT: ICD-10-CM

## 2025-05-22 DIAGNOSIS — Z00.00 ANNUAL PHYSICAL EXAM: ICD-10-CM

## 2025-05-22 PROCEDURE — 99999 PR PBB SHADOW E&M-EST. PATIENT-LVL IV: CPT | Mod: PBBFAC,,, | Performed by: FAMILY MEDICINE

## 2025-05-22 RX ORDER — ATORVASTATIN CALCIUM 80 MG/1
80 TABLET, FILM COATED ORAL DAILY
Qty: 90 TABLET | Refills: 1 | Status: SHIPPED | OUTPATIENT
Start: 2025-05-22 | End: 2026-05-22

## 2025-05-23 PROBLEM — T14.8XXA: Status: RESOLVED | Noted: 2022-12-28 | Resolved: 2025-05-23

## 2025-05-23 PROBLEM — E78.5 HYPERLIPIDEMIA: Status: ACTIVE | Noted: 2025-05-23

## 2025-05-23 PROBLEM — E78.2 MIXED HYPERLIPIDEMIA: Status: ACTIVE | Noted: 2025-05-23

## 2025-05-23 PROBLEM — S46.011A TRAUMATIC INCOMPLETE TEAR OF RIGHT ROTATOR CUFF: Status: RESOLVED | Noted: 2023-05-01 | Resolved: 2025-05-23

## 2025-05-23 PROBLEM — E78.5 HYPERLIPIDEMIA: Status: RESOLVED | Noted: 2025-05-23 | Resolved: 2025-05-23

## 2025-05-23 PROBLEM — M25.511 ACUTE PAIN OF RIGHT SHOULDER: Status: RESOLVED | Noted: 2023-05-01 | Resolved: 2025-05-23

## 2025-05-23 NOTE — PROGRESS NOTES
Subjective     History of Present Illness    CHIEF COMPLAINT:  Patient presents today to establish care.  Patient reports was started on medication to help lower cholesterol several months ago.  Due for repeat lab draw.    MEDICAL HISTORY:  Hyperlipidemia, vitamin-D insufficiency    HYPERLIPIDEMIA:  Total cholesterol was 221 mg/dL in August 2024.    CURRENT MEDICATIONS:  He takes atorvastatin 80 mg for hyperlipidemia, as well as fish oil and vitamin D supplements.        Review of Systems   Constitutional: Negative.    HENT: Negative.     Eyes: Negative.    Respiratory: Negative.     Cardiovascular: Negative.    Gastrointestinal: Negative.    Genitourinary: Negative.    Musculoskeletal: Negative.    Skin: Negative.    Neurological: Negative.    Endo/Heme/Allergies: Negative.    Psychiatric/Behavioral: Negative.          Objective   Physical Exam  Vitals reviewed.   Constitutional:       Appearance: Normal appearance. He is not ill-appearing.   HENT:      Head: Normocephalic and atraumatic.      Right Ear: Tympanic membrane and ear canal normal.      Left Ear: Tympanic membrane and ear canal normal.      Ears:      Comments: Nonobstructive cerumen bilaterally     Nose: Nose normal.      Mouth/Throat:      Mouth: Mucous membranes are moist.      Pharynx: Oropharynx is clear.   Eyes:      Extraocular Movements: Extraocular movements intact.      Conjunctiva/sclera: Conjunctivae normal.   Cardiovascular:      Rate and Rhythm: Normal rate and regular rhythm.      Pulses: Normal pulses.   Pulmonary:      Effort: Pulmonary effort is normal.      Breath sounds: Normal breath sounds.   Abdominal:      General: Abdomen is flat.      Palpations: Abdomen is soft. There is no mass.      Tenderness: There is no abdominal tenderness.   Musculoskeletal:         General: No swelling or tenderness. Normal range of motion.      Cervical back: Neck supple. No tenderness.   Lymphadenopathy:      Cervical: No cervical adenopathy.  "  Skin:     General: Skin is warm and dry.      Capillary Refill: Capillary refill takes less than 2 seconds.   Neurological:      General: No focal deficit present.      Mental Status: He is alert.   Psychiatric:         Mood and Affect: Mood normal.        Review of patient's allergies indicates:   Allergen Reactions    Augmentin [amoxicillin-pot clavulanate] Nausea And Vomiting    Biaxin [clarithromycin]       Vitals:    05/22/25 1542   BP: 120/70   BP Location: Right arm   Patient Position: Sitting   Pulse: 77   SpO2: 97%   Weight: 79.2 kg (174 lb 9.6 oz)   Height: 5' 6" (1.676 m)           Results for orders placed or performed in visit on 08/09/24   CBC Auto Differential    Collection Time: 08/09/24  7:16 AM   Result Value Ref Range    WBC 5.12 3.90 - 12.70 K/uL    RBC 5.18 4.60 - 6.20 M/uL    Hemoglobin 15.0 14.0 - 18.0 g/dL    Hematocrit 44.4 40.0 - 54.0 %    MCV 86 82 - 98 fL    MCH 29.0 27.0 - 31.0 pg    MCHC 33.8 32.0 - 36.0 g/dL    RDW 13.4 11.5 - 14.5 %    Platelets 187 150 - 450 K/uL    MPV 11.0 9.2 - 12.9 fL    Immature Granulocytes 0.2 0.0 - 0.5 %    Gran # (ANC) 2.4 1.8 - 7.7 K/uL    Immature Grans (Abs) 0.01 0.00 - 0.04 K/uL    Lymph # 1.8 1.0 - 4.8 K/uL    Mono # 0.6 0.3 - 1.0 K/uL    Eos # 0.2 0.0 - 0.5 K/uL    Baso # 0.06 0.00 - 0.20 K/uL    nRBC 0 0 /100 WBC    Gran % 47.5 38.0 - 73.0 %    Lymph % 35.9 18.0 - 48.0 %    Mono % 11.5 4.0 - 15.0 %    Eosinophil % 3.7 0.0 - 8.0 %    Basophil % 1.2 0.0 - 1.9 %    Differential Method Automated    Comprehensive Metabolic Panel    Collection Time: 08/09/24  7:16 AM   Result Value Ref Range    Sodium 140 136 - 145 mmol/L    Potassium 3.9 3.5 - 5.1 mmol/L    Chloride 105 95 - 110 mmol/L    CO2 24 23 - 29 mmol/L    Glucose 104 70 - 110 mg/dL    BUN 17 8 - 23 mg/dL    Creatinine 1.0 0.5 - 1.4 mg/dL    Calcium 9.2 8.7 - 10.5 mg/dL    Total Protein 7.3 6.0 - 8.4 g/dL    Albumin 3.8 3.5 - 5.2 g/dL    Total Bilirubin 0.6 0.1 - 1.0 mg/dL    Alkaline Phosphatase " 50 (L) 55 - 135 U/L    AST 24 10 - 40 U/L    ALT 33 10 - 44 U/L    eGFR >60.0 >60 mL/min/1.73 m^2    Anion Gap 11 8 - 16 mmol/L   Hemoglobin A1C    Collection Time: 08/09/24  7:16 AM   Result Value Ref Range    Hemoglobin A1C 5.7 (H) 4.0 - 5.6 %    Estimated Avg Glucose 117 68 - 131 mg/dL   Hepatitis C Antibody    Collection Time: 08/09/24  7:16 AM   Result Value Ref Range    Hepatitis C Ab Non-reactive Non-reactive   HIV 1/2 Ag/Ab (4th Gen)    Collection Time: 08/09/24  7:16 AM   Result Value Ref Range    HIV 1/2 Ag/Ab Non-reactive Non-reactive   Lipid Panel    Collection Time: 08/09/24  7:16 AM   Result Value Ref Range    Cholesterol 221 (H) 120 - 199 mg/dL    Triglycerides 60 30 - 150 mg/dL    HDL 56 40 - 75 mg/dL    LDL Cholesterol 153.0 63.0 - 159.0 mg/dL    HDL/Cholesterol Ratio 25.3 20.0 - 50.0 %    Total Cholesterol/HDL Ratio 3.9 2.0 - 5.0    Non-HDL Cholesterol 165 mg/dL   PSA, Screening    Collection Time: 08/09/24  7:16 AM   Result Value Ref Range    PSA, Screen 0.74 0.00 - 4.00 ng/mL   TSH    Collection Time: 08/09/24  7:16 AM   Result Value Ref Range    TSH 1.867 0.400 - 4.000 uIU/mL           Assessment & Plan    E78.5 Hyperlipidemia, unspecified  E66.3 Overweight    HYPERLIPIDEMIA:  - Reviewed lipid panel from previous year, noting total cholesterol of 221 mg/dL, which was barely elevated.  - Evaluated cardiovascular risk factors including smoking status, exercise habits, and family history.  - Discussed risk assessment for non-fatal MI or stroke to determine appropriate statin therapy.  - Will continue atorvastatin 80 mg (initiated by previous provider), fish oil, and vitamin D.  - Ordered new lipid panel to reassess cholesterol levels after initiation of statin therapy.  -ASCVD 10 year risk calculated: 14.2% with a previous numbers.  We will repeat labs this Saturday, lipid.  Encouraged losing 5-10 lb to help lower blood pressure.  Also help reduce BMI.    OVERWEIGHT:  - Monitored patient's current  weight at 174 lbs with a BMI of 28.18, placing patient in the overweight category.  - Explained BMI calculation and its relation to height and weight.  - Discussed cardiovascular disease risk in context of [place] obesity trends.  - Provided information on height loss as a natural part of aging.             This note was generated with the assistance of ambient listening technology. Verbal consent was obtained by the patient and accompanying visitor(s) for the recording of patient appointment to facilitate this note. I attest to having reviewed and edited the generated note for accuracy, though some syntax or spelling errors may persist. Please contact the author of this note for any clarification.     No follow-ups on file.       Michael Gallegos MD  [facility]  [address] [place] [address],[place] [id number] [phone]

## 2025-05-26 ENCOUNTER — RESULTS FOLLOW-UP (OUTPATIENT)
Dept: FAMILY MEDICINE | Facility: CLINIC | Age: 70
End: 2025-05-26

## 2025-05-26 ENCOUNTER — LAB VISIT (OUTPATIENT)
Dept: LAB | Facility: HOSPITAL | Age: 70
End: 2025-05-26
Attending: FAMILY MEDICINE
Payer: COMMERCIAL

## 2025-05-26 DIAGNOSIS — E55.9 VITAMIN D INSUFFICIENCY: ICD-10-CM

## 2025-05-26 LAB
CHOLEST SERPL-MCNC: 136 MG/DL (ref 120–199)
CHOLEST/HDLC SERPL: 3 {RATIO} (ref 2–5)
HDLC SERPL-MCNC: 45 MG/DL (ref 40–75)
HDLC SERPL: 33.1 % (ref 20–50)
LDLC SERPL CALC-MCNC: 82.2 MG/DL (ref 63–159)
NONHDLC SERPL-MCNC: 91 MG/DL
TRIGL SERPL-MCNC: 44 MG/DL (ref 30–150)

## 2025-05-26 PROCEDURE — 36415 COLL VENOUS BLD VENIPUNCTURE: CPT

## 2025-05-26 PROCEDURE — 82465 ASSAY BLD/SERUM CHOLESTEROL: CPT

## 2025-08-25 ENCOUNTER — OFFICE VISIT (OUTPATIENT)
Dept: FAMILY MEDICINE | Facility: CLINIC | Age: 70
End: 2025-08-25
Payer: COMMERCIAL

## 2025-08-25 VITALS
BODY MASS INDEX: 27.64 KG/M2 | OXYGEN SATURATION: 95 % | HEIGHT: 66 IN | HEART RATE: 71 BPM | DIASTOLIC BLOOD PRESSURE: 68 MMHG | SYSTOLIC BLOOD PRESSURE: 106 MMHG | WEIGHT: 172 LBS

## 2025-08-25 DIAGNOSIS — E78.5 HYPERLIPIDEMIA, UNSPECIFIED HYPERLIPIDEMIA TYPE: ICD-10-CM

## 2025-08-25 DIAGNOSIS — E55.9 VITAMIN D DEFICIENCY: ICD-10-CM

## 2025-08-25 DIAGNOSIS — N40.0 BPH WITHOUT URINARY OBSTRUCTION: Primary | ICD-10-CM

## 2025-08-25 DIAGNOSIS — Z00.00 ANNUAL PHYSICAL EXAM: ICD-10-CM

## 2025-08-25 PROCEDURE — 99999 PR PBB SHADOW E&M-EST. PATIENT-LVL III: CPT | Mod: PBBFAC,,, | Performed by: FAMILY MEDICINE

## 2025-08-25 RX ORDER — ATORVASTATIN CALCIUM 80 MG/1
80 TABLET, FILM COATED ORAL DAILY
Qty: 90 TABLET | Refills: 1 | Status: SHIPPED | OUTPATIENT
Start: 2025-08-25 | End: 2026-08-25